# Patient Record
Sex: FEMALE | Race: BLACK OR AFRICAN AMERICAN
[De-identification: names, ages, dates, MRNs, and addresses within clinical notes are randomized per-mention and may not be internally consistent; named-entity substitution may affect disease eponyms.]

---

## 2017-02-24 ENCOUNTER — HOSPITAL ENCOUNTER (INPATIENT)
Dept: HOSPITAL 61 - ER | Age: 72
Discharge: HOME | DRG: 313 | End: 2017-02-24
Attending: INTERNAL MEDICINE | Admitting: INTERNAL MEDICINE
Payer: MEDICARE

## 2017-02-24 VITALS — WEIGHT: 185 LBS | BODY MASS INDEX: 34.04 KG/M2 | HEIGHT: 62 IN

## 2017-02-24 VITALS
SYSTOLIC BLOOD PRESSURE: 126 MMHG | DIASTOLIC BLOOD PRESSURE: 67 MMHG | SYSTOLIC BLOOD PRESSURE: 126 MMHG | DIASTOLIC BLOOD PRESSURE: 67 MMHG | SYSTOLIC BLOOD PRESSURE: 126 MMHG | DIASTOLIC BLOOD PRESSURE: 67 MMHG | SYSTOLIC BLOOD PRESSURE: 126 MMHG | DIASTOLIC BLOOD PRESSURE: 67 MMHG

## 2017-02-24 VITALS — SYSTOLIC BLOOD PRESSURE: 140 MMHG | DIASTOLIC BLOOD PRESSURE: 73 MMHG

## 2017-02-24 VITALS — DIASTOLIC BLOOD PRESSURE: 70 MMHG | SYSTOLIC BLOOD PRESSURE: 138 MMHG

## 2017-02-24 DIAGNOSIS — R07.89: Primary | ICD-10-CM

## 2017-02-24 DIAGNOSIS — I10: ICD-10-CM

## 2017-02-24 DIAGNOSIS — Z88.8: ICD-10-CM

## 2017-02-24 DIAGNOSIS — K21.9: ICD-10-CM

## 2017-02-24 DIAGNOSIS — M19.90: ICD-10-CM

## 2017-02-24 DIAGNOSIS — Z88.6: ICD-10-CM

## 2017-02-24 DIAGNOSIS — Z82.49: ICD-10-CM

## 2017-02-24 DIAGNOSIS — M85.80: ICD-10-CM

## 2017-02-24 DIAGNOSIS — Z79.82: ICD-10-CM

## 2017-02-24 DIAGNOSIS — F41.9: ICD-10-CM

## 2017-02-24 DIAGNOSIS — Z90.710: ICD-10-CM

## 2017-02-24 LAB
ANION GAP SERPL CALC-SCNC: 9 MMOL/L (ref 6–14)
BASOPHILS # BLD AUTO: 0.1 X10^3/UL (ref 0–0.2)
BASOPHILS NFR BLD: 1 % (ref 0–3)
BUN SERPL-MCNC: 14 MG/DL (ref 7–20)
CALCIUM SERPL-MCNC: 9.8 MG/DL (ref 8.5–10.1)
CHLORIDE SERPL-SCNC: 107 MMOL/L (ref 98–107)
CHOLEST SERPL-MCNC: 216 MG/DL (ref 0–200)
CHOLEST/HDLC SERPL: 3.1 {RATIO}
CO2 SERPL-SCNC: 29 MMOL/L (ref 21–32)
CREAT SERPL-MCNC: 0.9 MG/DL (ref 0.6–1)
EOSINOPHIL NFR BLD: 2 % (ref 0–3)
ERYTHROCYTE [DISTWIDTH] IN BLOOD BY AUTOMATED COUNT: 14.8 % (ref 11.5–14.5)
GFR SERPLBLD BASED ON 1.73 SQ M-ARVRAT: 74.5 ML/MIN
GLUCOSE SERPL-MCNC: 100 MG/DL (ref 70–99)
HCT VFR BLD CALC: 40.6 % (ref 36–47)
HDLC SERPL-MCNC: 69 MG/DL (ref 40–60)
HGB BLD-MCNC: 13.1 G/DL (ref 12–15.5)
LYMPHOCYTES # BLD: 2 X10^3/UL (ref 1–4.8)
LYMPHOCYTES NFR BLD AUTO: 34 % (ref 24–48)
MAGNESIUM SERPL-MCNC: 2.2 MG/DL (ref 1.8–2.4)
MCH RBC QN AUTO: 27 PG (ref 25–35)
MCHC RBC AUTO-ENTMCNC: 32 G/DL (ref 31–37)
MCV RBC AUTO: 82 FL (ref 79–100)
MONOCYTES NFR BLD: 10 % (ref 0–9)
NEUTROPHILS NFR BLD AUTO: 53 % (ref 31–73)
PLATELET # BLD AUTO: 269 X10^3/UL (ref 140–400)
POTASSIUM SERPL-SCNC: 3.7 MMOL/L (ref 3.5–5.1)
RBC # BLD AUTO: 4.95 X10^6/UL (ref 3.5–5.4)
SODIUM SERPL-SCNC: 145 MMOL/L (ref 136–145)
TRIGL SERPL-MCNC: 65 MG/DL (ref 0–150)
WBC # BLD AUTO: 5.7 X10^3/UL (ref 4–11)

## 2017-02-24 PROCEDURE — 71010: CPT

## 2017-02-24 PROCEDURE — 80061 LIPID PANEL: CPT

## 2017-02-24 PROCEDURE — 96374 THER/PROPH/DIAG INJ IV PUSH: CPT

## 2017-02-24 PROCEDURE — 84443 ASSAY THYROID STIM HORMONE: CPT

## 2017-02-24 PROCEDURE — 83735 ASSAY OF MAGNESIUM: CPT

## 2017-02-24 PROCEDURE — 80048 BASIC METABOLIC PNL TOTAL CA: CPT

## 2017-02-24 PROCEDURE — 93306 TTE W/DOPPLER COMPLETE: CPT

## 2017-02-24 PROCEDURE — 93005 ELECTROCARDIOGRAM TRACING: CPT

## 2017-02-24 PROCEDURE — 84484 ASSAY OF TROPONIN QUANT: CPT

## 2017-02-24 PROCEDURE — 85027 COMPLETE CBC AUTOMATED: CPT

## 2017-02-24 PROCEDURE — 36415 COLL VENOUS BLD VENIPUNCTURE: CPT

## 2017-02-24 NOTE — PHYS DOC
Past Medical History


Past Medical History:  GERD, Hypertension


Additional Past Medical Histor:  low potassium


Past Surgical History:  Hysterectomy, Other


Additional Past Surgical Histo:  exploratory abdominal surgery


Alcohol Use:  None


Drug Use:  None





Adult General


Chief Complaint


Chief Complaint:  CHEST PAIN





[facility]


HPI


72-year-old female woke around 0130 hrs. with significant left upper chest pain 

that does radiate into her back somewhat. She rates it a 6 out of 10. She is in 

no acute distress. She states the last time she had symptoms similar to this 

was back in 2002 in which she states she did have a heart catheterization at 

that time but did not have any cardiac intervention. She states she has history 

of hypertension but no other major medical problems that she is aware of. She 

denies any history of heart disease. She denies any shortness of breath. She 

denies any nausea or vomiting. She denies any dizziness or lightheadedness. She 

denies any diaphoresis.





Review of Systems


Review of Systems





Constitutional: Denies fever or chills []


Eyes: Denies change in visual acuity, redness, or eye pain []


HENT: Denies nasal congestion or sore throat []


Respiratory: Denies cough or shortness of breath []


Cardiovascular: No additional information not addressed in HPI []


GI: Denies abdominal pain, nausea, vomiting, bloody stools or diarrhea []


: Denies dysuria or hematuria []


Musculoskeletal: Denies back pain or joint pain []


Integument: Denies rash or skin lesions []


Neurologic: Denies headache, focal weakness or sensory changes []


Endocrine: Denies polyuria or polydipsia []





Current Medications


Current Medications





 Current Medications








 Medications


  (Trade)  Dose


 Ordered  Sig/[facility]  Start Time


 Stop Time Status Last Admin


Dose Admin


 


 Acetaminophen


  (Tylenol)  650 mg  PRN Q4HRS  PRN  2/24/17 04:00


 2/25/17 03:59   


 


 


 Aspirin


  (Guru Aspirin)  325 mg  1X  ONCE  2/24/17 04:00


 2/24/17 04:02 DC 2/24/17 04:21


325 MG


 


 Nitroglycerin


  (Nitrostat)  0.4 mg  PRN Q5MIN  PRN  2/24/17 04:00


    2/24/17 04:21


0.4 MG


 


 Ondansetron HCl


  (Zofran)  4 mg  PRN Q8HRS  PRN  2/24/17 04:00


 2/25/17 03:59  2/24/17 04:20


4 MG











Allergies


Allergies





 Allergies








Coded Allergies Type Severity Reaction Last Updated Verified


 


  ACE Inhibitors Allergy Severe SWELLING ON FACE /THROAT 7/7/15 Yes


 


  codeine Adverse Reaction Intermediate nausea/vomitting 7/7/15 Yes











Physical Exam


Physical Exam





Constitutional: Well developed, well nourished, no acute distress, non-toxic 

appearance. []


HENT: Normocephalic, atraumatic, bilateral external ears normal, oropharynx 

moist, no oral exudates, nose normal. []


Eyes: PERRLA, EOMI, conjunctiva normal, no discharge. [] 


Neck: Normal range of motion, no tenderness, supple, no stridor. [] 


Cardiovascular:Heart rate regular rhythm, no murmur []


Lungs & Thorax:  Bilateral breath sounds clear to auscultation []


Abdomen: Bowel sounds normal, soft, no tenderness, no masses, no pulsatile 

masses. [] 


Skin: Warm, dry, no erythema, no rash. [] 


Back: No tenderness, no CVA tenderness. [] 


Extremities: No tenderness, no cyanosis, no clubbing, ROM intact, no edema. [] 


Neurologic: Alert and oriented X 3, normal motor function, normal sensory 

function, no focal deficits noted. []


Psychologic: Affect normal, judgement normal, mood normal. []





Current Patient Data


Vital Signs





 Vital Signs








  Date Time  Temp Pulse Resp B/P Pulse Ox O2 Delivery O2 Flow Rate FiO2


 


2/24/17 04:21  55  137/67    


 


2/24/17 03:39 97.7  16  98 Room Air  





 97.7       








Lab Values





 Laboratory Tests








Test


  2/24/17


04:05


 


White Blood Count


  5.7x10^3/uL


(4.0-11.0)


 


Red Blood Count


  4.95x10^6/uL


(3.50-5.40)


 


Hemoglobin


  13.1g/dL


(12.0-15.5)


 


Hematocrit


  40.6%


(36.0-47.0)


 


Mean Corpuscular Volume 82fL ()  


 


Mean Corpuscular Hemoglobin 27pg (25-35)  


 


Mean Corpuscular Hemoglobin


Concent 32g/dL (31-37)


 


 


Red Cell Distribution Width


  14.8%


(11.5-14.5)  H


 


Platelet Count


  269x10^3/uL


(140-400)


 


Neutrophils (%) (Auto) 53% (31-73)  


 


Lymphocytes (%) (Auto) 34% (24-48)  


 


Monocytes (%) (Auto) 10% (0-9)  H


 


Eosinophils (%) (Auto) 2% (0-3)  


 


Basophils (%) (Auto) 1% (0-3)  


 


Neutrophils # (Auto)


  3.0x10^3uL


(1.8-7.7)


 


Lymphocytes # (Auto)


  2.0x10^3/uL


(1.0-4.8)


 


Monocytes # (Auto)


  0.6x10^3/uL


(0.0-1.1)


 


Eosinophils # (Auto)


  0.1x10^3/uL


(0.0-0.7)


 


Basophils # (Auto)


  0.1x10^3/uL


(0.0-0.2)


 


Sodium Level


  145mmol/L


(136-145)


 


Potassium Level


  3.7mmol/L


(3.5-5.1)


 


Chloride Level


  107mmol/L


()


 


Carbon Dioxide Level


  29mmol/L


(21-32)


 


Anion Gap 9 (6-14)  


 


Blood Urea Nitrogen


  14mg/dL (7-20)


 


 


Creatinine


  0.9mg/dL


(0.6-1.0)


 


Estimated GFR


(Cockcroft-Gault) 74.5  


 


 


Glucose Level


  100mg/dL


(70-99)  H


 


Calcium Level


  9.8mg/dL


(8.5-10.1)


 


Troponin I Quantitative


  < 0.017ng/mL


(0.000-0.055)





 Laboratory Tests


2/24/17 04:05








 Laboratory Tests


2/24/17 04:05











EKG


EKG


EKG as interpreted by me shows a sinus rhythm with a rate of 59 bpm. There are 

some T-wave inversions seen in V2 and V3 but no other obvious findings are seen.





Radiology/Procedures


Radiology/Procedures


One view of the chest as interpreted by me does not reveal an acute 

cardiopulmonary process.





Course & Med Decision Making


Course & Med Decision Making


Pertinent Labs and Imaging studies reviewed. (See chart for details)





This 72-year-old female with ongoing chest pain will be given a sublingual 

nitroglycerin and will be admitted to the hospital for further observation and 

treatment. I will discuss the case with Dr. Mcghee in place cardiology consult. 

Her EKG at this time does not reveal any obvious signs of ischemia. Her chest x-

ray is unremarkable. She is in no acute distress. A full laboratory workup 

including a set of cardiac enzymes will be obtained.





Her laboratory workup is unremarkable including the first set of cardiac 

enzymes. Patient feels slight improvement after nitroglycerin dose. She was 

admitted without incident.





Dragon Disclaimer


Dragon Disclaimer


This electronic medical record was generated, in whole or in part, using a 

voice recognition dictation system.





Departure


Departure


Impression:  


 Primary Impression:  


 Chest pain


Disposition:  09 ADMITTED AS INPATIENT


Admitting Physician:  Maria Teresa Mcghee


Condition:  STABLE


Referrals:  


KHADRA GARCIA MD (PCP)








LYNNE DESAI DO Feb 24, 2017 04:00

## 2017-02-24 NOTE — CARD
--------------- APPROVED REPORT --------------





EXAM: Two-dimensional and M-mode echocardiogram with Doppler and color Doppler.



Other Information 

Quality : Fair



INDICATION

Chest Pain 



2D DIMENSIONS 

RVDd2.7 (2.9-3.5cm)Left Atrium(2D)2.8 (1.6-4.0cm)

IVSd1.0 (0.7-1.1cm)Aortic Root(2D)3.1 (2.0-3.7cm)

LVDd4.9 (3.9-5.9cm)LVOT Diameter2.0 (1.8-2.4cm)

PWd1.1 (0.7-1.1cm)LVDs3.0 (2.5-4.0cm)

FS (%) 30.0 %SV78.1 ml

LVEF(%)60.0 (>50%)



Aortic Valve

AoV Peak Geovany.99.6cm/sAoV VTI19.2cm

AO Peak GR.4.0mmHgLVOT Peak Geovany.64.3cm/s

LVOT  VTI 19.70cmAO Mean GR.2mmHg

YUNI (VMAX)2.46tf9VDB   (VTI)3.38cm2



Mitral Valve

MV E Uchqrner38.2cm/sMV DECEL [id number]

MV A Ixkjhzah52.6cm/sMV [id number]

E/A  Ratio0.7MVA (PHT)2.03cm2



TDI

E/Lateral E'7.3E/Medial E'10.0



Tricuspid Valve

TR P. Kwatlqju853jc/sRAP [id number]

TR Peak Gr.35rbVxXWVL02zbWm



Pulmonary Vein

S1 Vmkskpox07.1cm/sD2 Jasusowu51.0cm/s

PVa touhcjbo092hogz



 LEFT VENTRICLE 

The left ventricle is normal size. There is normal left ventricular wall thickness. The left ventricu
lar systolic function is normal. The Ejection Fraction is 55-60%. There is normal LV segmental wall m
otion. Transmitral Doppler flow pattern is Grade I-abnormal relaxation pattern.



 RIGHT VENTRICLE 

The right ventricle is normal size. The right ventricular systolic function is normal.



 ATRIA 

The left atrium size is normal. The right atrium size is normal. The interatrial septum is intact wit
h no evidence for an atrial septal defect or patent foramen ovale as noted on 2-D or Doppler imaging.




 AORTIC VALVE 

The aortic valve is calcified but opens well. Doppler and Color Flow revealed trace to mild aortic re
gurgitation. There is no significant aortic valvular stenosis.



 MITRAL VALVE 

The mitral valve is normal in structure and function. There is no evidence of mitral valve prolapse. 
There is no mitral valve stenosis. Doppler and Color Flow revealed no mitral valve regurgitation note
d.



 TRICUSPID VALVE 

The tricuspid valve is normal in structure and function. Doppler and Color Flow revealed mild tricusp
id regurgitation. The PA pressure was estimated at 27 mmHg. There is no tricuspid valve stenosis.



 PULMONIC VALVE 

The pulmonary valve is normal in structure and function. Doppler and Color Flow revealed no pulmonic 
valvular regurgitation. There is no pulmonic valvular stenosis.



 GREAT VESSELS 

The aortic root is normal in size. The ascending aorta is normal in size. The IVC is normal in size a
nd collapses >50% with inspiration.



 PERICARDIAL EFFUSION 

There is no evidence of significant pericardial effusion.



Critical Notification

Critical Value: No



<Conclusion>

The left ventricular systolic function is normal.

The Ejection Fraction is 55-60%.

There is normal LV segmental wall motion.

Transmitral Doppler flow pattern is Grade I-abnormal relaxation pattern.

Trace to mild aortic regurgitation.

Mild tricuspid regurgitation.

The PA pressure was estimated at 27 mmHg.

There is no evidence of significant pericardial effusion.

## 2017-02-24 NOTE — PDOC2
CARDIAC CONSULT


DATE OF CONSULT


Date of Consult


DATE: 2/24/17 


TIME: 09:06





REASON FOR CONSULT


Reason for Consult:


Chest pain





REFERRING PHYSICIAN


Referring Physician:


Larissa





SOURCE


Source:  Chart review, Patient





HISTORY OF PRESENT ILLNESS


HISTORY OF PRESENT ILLNESS


This is a pleasant 71 yo female admitted for complains of chest pain.  This is 

mid chest and nonradiating and reproducible with positional changes and 

palpation, increased with left arm movements. Reports that his woke her up at 

around 0130 and has been nagging feeling since then but eventually dissipated.  

She took ASA as well.  Reports no nausea, palpitations, SOA.  She has not been 

having any decreased tolerance withe activity.  Denies any prior injury or falls

, no prior VTE, heavy lifting or significant increase in exertional activities.

  Denies any CAD. CVA, MVA. She did have LHC in 2002 and she was told of 

vasopasm but no CAD. She has HTN and she is compliant with her meds.





PAST MEDICAL HISTORY


Cardiovascular:  HTN


Pulmonary:  No pertinent hx


CENTRAL NERVOUS SYSTEM:  Other (No pertinent history)


GI:  GERD


Heme/Onc:  No pertinent hx


Psych:  No pertinent hx


Musculoskeletal:  Osteoarthritis


Rheumatologic:  No pertinent hx


Infectious disease:  No pertinent hx


ENT:  No pertinent hx


Renal/:  No pertinent hx


Endocrine:  Osteopenia


Dermatology:  No pertinent hx





PAST SURGICAL HISTORY


Past Surgical History:  Hysterectomy, Other (exploratory laparotomy ? could not 

remember the reason)





FAMILY HISTORY


Family History:  Coronary Artery Disease (Father in his 50s and mother in her 

80s)





SOCIAL HISTORY


Smoke:  No


ALCOHOL:  none


Drugs:  None


Lives:  with Family





CURRENT MEDICATIONS


CURRENT MEDICATIONS





Current Medications








 Medications


  (Trade)  Dose


 Ordered  Sig/[facility]


 Route


 PRN Reason  Start Time


 Stop Time Status Last Admin


Dose Admin


 


 Nitroglycerin


  (Nitrostat)  0.4 mg  PRN Q5MIN  PRN


 SL


 CHEST PAIN  2/24/17 04:00


    2/24/17 04:21


 


 


 Ondansetron HCl


  (Zofran)  4 mg  PRN Q8HRS  PRN


 IV


 NAUSEA/VOMITING  2/24/17 04:00


 2/25/17 03:59  2/24/17 04:20


 


 


 Aspirin


  (Guru Aspirin)  325 mg  1X  ONCE


 PO


   2/24/17 04:00


 2/24/17 04:02 DC 2/24/17 04:21


 











ALLERGIES


ALLERGIES:  


Coded Allergies:  


     ACE Inhibitors (Verified  Allergy, Severe, SWELLING ON FACE /THROAT, 7/7/15

)


     codeine (Verified  Adverse Reaction, Intermediate, nausea/vomitting, 7/7/15

)





ROS


Review of System


14 point ROS evaluated with pertinent positives noted per HPI





PHYSICAL EXAM


General:  Alert, Oriented X3, Cooperative, No acute distress


HEENT:  Atraumatic, Mucous membr. moist/pink


Lungs:  Clear to auscultation, Normal air movement


Heart:  Regular rate, Normal S1, Normal S2, No murmurs


Abdomen:  Soft, No tenderness


Extremities:  No cyanosis, No edema


Neuro:  Normal speech, Sensation intact


Psych/Mental Status:  Mental status NL, Mood NL


MUSCULOSKELETAL:  Full range of motion without pain, Osteoarthritic changes 

both hands





VITALS


VITALS





 Vital Signs








  Date Time  Temp Pulse Resp B/P Pulse Ox O2 Delivery O2 Flow Rate FiO2


 


2/24/17 05:43  57  152/73 96 Room Air  


 


2/24/17 03:39 97.7  16     





 97.7       











LABS


Lab:





Laboratory Tests








Test


  2/24/17


04:05


 


White Blood Count


  5.7x10^3/uL


(4.0-11.0)


 


Red Blood Count


  4.95x10^6/uL


(3.50-5.40)


 


Hemoglobin


  13.1g/dL


(12.0-15.5)


 


Hematocrit


  40.6%


(36.0-47.0)


 


Mean Corpuscular Volume 82fL () 


 


Mean Corpuscular Hemoglobin 27pg (25-35) 


 


Mean Corpuscular Hemoglobin


Concent 32g/dL (31-37) 


 


 


Red Cell Distribution Width


  14.8%


(11.5-14.5)


 


Platelet Count


  269x10^3/uL


(140-400)


 


Neutrophils (%) (Auto) 53% (31-73) 


 


Lymphocytes (%) (Auto) 34% (24-48) 


 


Monocytes (%) (Auto) 10% (0-9) 


 


Eosinophils (%) (Auto) 2% (0-3) 


 


Basophils (%) (Auto) 1% (0-3) 


 


Neutrophils # (Auto)


  3.0x10^3uL


(1.8-7.7)


 


Lymphocytes # (Auto)


  2.0x10^3/uL


(1.0-4.8)


 


Monocytes # (Auto)


  0.6x10^3/uL


(0.0-1.1)


 


Eosinophils # (Auto)


  0.1x10^3/uL


(0.0-0.7)


 


Basophils # (Auto)


  0.1x10^3/uL


(0.0-0.2)


 


Sodium Level


  145mmol/L


(136-145)


 


Potassium Level


  3.7mmol/L


(3.5-5.1)


 


Chloride Level


  107mmol/L


()


 


Carbon Dioxide Level


  29mmol/L


(21-32)


 


Anion Gap 9 (6-14) 


 


Blood Urea Nitrogen 14mg/dL (7-20) 


 


Creatinine


  0.9mg/dL


(0.6-1.0)


 


Estimated GFR


(Cockcroft-Gault) 74.5 


 


 


Glucose Level


  100mg/dL


(70-99)


 


Calcium Level


  9.8mg/dL


(8.5-10.1)


 


Troponin I Quantitative


  < 0.017ng/mL


(0.000-0.055)











ASSESSMENT/PLAN


ASSESSMENT/PLAN


1. Atypical CP: suspect MSK. 


2. Accelerated HTN: much better now.  elevation likely from anxiety





Recommendations


1. Repeat troponin. Obtain TSH, lipid panel


2. EKG SB with no significant changes by comparison. TTE today. If unremarkable 

then no further cardiac testing. 


3. Continue with home amlodipine and start daily ECASA 81 mg.


Problems:  








JOSIANE MONTANEZ Feb 24, 2017 09:18

## 2017-02-24 NOTE — DS
DATE OF DISCHARGE:  02/24/2017



CHIEF COMPLAINT:  Chest pain.



HISTORY OF PRESENT ILLNESS:  This is a 72-year-old  female with prior

history of hypertension and coronary artery disease in family presents to the ER

with complaints of left-sided chest pain localized, presents, from yesterday

early morning she woke up in the middle of the night with localized pain.  There

is not any radiation or nausea or vomiting or palpitations or diaphoresis.  She

had a stress test in the past.  As per the patient, it was normal.  She was

admitted to the hospital to rule out ACS.  At the time of my examination this

morning, the patient denies any active chest pain.  She is resting comfortably.



PAST MEDICAL HISTORY AND REVIEW OF SYSTEMS:  Please see electronic H and P.



LABORATORY FINDINGS:  Troponin 3 sets negative.  BMP within normal range.  CBC

within normal range.  Lipid panel, cholesterol 216, LDL is 134, VLDL is 13, HDL

is 69.  TSH is 3.190.



ASSESSMENT AND PLAN:

1.  Chest pain in an adult with prior history of coronary artery disease in the

family.

2.  Hypertension.



PLAN:  She has been admitted to rule out ACS, and 3 sets of electrocardiograms

and echocardiograms have been ordered and cardiac consult.  The patient did not

have any other risk factors for PE or aortic dissection.



BRIEF HOSPITAL COURSE:  She has been admitted and monitored in telemetry, and 3

sets of troponins withdrawn, which were negative, and clinically the patient is

chest pain free, and she has chest tenderness on examination and had evaluation

by Cardiology, declared that the patient has noncardiac chest pain, most likely

musculoskeletal.



DISCHARGE DISPOSITION:  Home.



DISCHARGE CONDITION:  Stable.



DISCHARGE MEDICATIONS:  Continue current medications.  She has been recommended

to do aggressive diet management for lipid control.



DIET:  Low fat diet and cardiac diet.



DISPOSITION:  Home.



Total time spent for exam and discharge is 45 minutes.

 



______________________________

EZIO DORAN MD



DR:  BRENDAN/jazmin  JOB#:  [id number] / [id number]

DD:  02/24/2017 19:11  DT:  02/24/2017 22:52

## 2017-02-24 NOTE — ACF
Admit Criteria Forms


                            CARDIOLOGY GRG





Clinical Indications for Admission to Inpatient Care


    


                                                                               (

Place 'X' for any and all applicable criteria): 





Hospital admission is needed for appropriate care of the patient because of ANY 

ONE of the following (1): 





[ ] I.    Hemodynamic instability as indicated by ALL of the following (1)(2)(3)

(4)(5)


         [ ]a)   Vital signs or other findings not as expected for chronic 

patient condition or baseline


         [ ]b)   Instability indicated by ANY ONE of the following:


                  [ ]i)     Hypotension


                  [ ]ii)    Symptomatic Tachycardia unresponsive to treatment (

e.g., analgesia, fluids, sedation as indicated)


                  [ ]iii)   Inadequate perfusion indicated by ANY ONE of the 

following:


                            [ ] 1)   Lactic acidosis (> 2 mmol/L)


                            [ ] 2)   New abnormal capillary refill (> 3 seconds)


                            [ ] 3)   Reduced urine output


                            [ ] 4)   New altered mental status


                  [ ]iv)   Orthostatic vital sign changes unresponsive to 

treatment (e.g., fluids)              


                  [ ]v)    IV inotropic or vasopressor medication required to 

maintain adequate blood pressure or perfusion


[ ] II.  Severe heart failure as indicated by ANY ONE of the following(17)(18)


         [ ]a)  Respiratory distress        


         [ ]b)  Hypotension


         [ ]c)  Anasarca (refractory to outpatient therapy) 


         [ ]d)  Cardiac arrhythmias of immediate concern 


         [ ]e)  Myocardial ischemia


[ ] III. Cardiac arrhythmias or findings of immediate concern indicated by ANY 

ONE of the following (19)(20):


         [ ] a)  Heart rhythms that are inherently dangerous or unstable 

indicated by ANY ONE of the following (21)(22)(23):


                 [ ] i)    Resuscitated ventricular fibrillation or cardiac 

arrest


                 [ ] ii)   Ventricular escape rhythm


                 [ ] iii)  Sustained ventricular tachycardia (30 seconds or 

more of ventricular rhythm at greater than 100 beats per minute)


                 [ ] iv)  Nonsustained ventricular tachycardia and ANY ONE of 

the following:


                          [ ] 1)    Suspected cardiac ischemia as cause or 

consequence of ventricular tachycardia    


                          [ ] 2)    In setting of acute myocarditis         


         [ ] b)  Unstable cardiac conduction defects indicated by ANY ONE of 

the following(23)(24)(25)


                  [ ] i)    Type II second-degree atrioventricular block    


                  [ ]ii)    Third-degree atrioventricular block                


                  [ ]iii)    New-onset left bundle branch block with suspected 

myocardial ischemia


         [ ]c)   Any heart rhythm and ANY ONE of the following (21)(22)(26)(27) 

(28)


                  [ ] i)    Continuous long-term ECG monitoring needed (e.g., 

initiation of drug requiring monitoring for more than 24 hours)


                  [ ] ii)   Patient has automatic implanted cardioverter 

defibrillator that is repeatedly firing, malfunctioning, or in need of 

immediate 


                            adjustment of settings beyond the scope of 

ambulatory or observation care


        [ ]d)    Heart rhythms of concern due to ANY ONE of the following:


                  [ ] i)    Hypotension


                  [ ] ii)    Respiratory distress


                  [ ] iii)   Association with other significant symptoms (e.g., 

bradycardia with syncope or ongoing dizziness, supraventricular 


                            tachycardia with chest pain (14)(15)(17)


[ ] IV.   Monitoring for cardiac contusion beyond the scope of observation care 

needed [A](30)(31)(32)


[ ] V.    Surgical or device complication (e.g., valve replacement complication

, pacemaker dysfunction) (35)(41)(44)(45)(46)


[ ] VI.   Inpatient palliative care needed. [B](49) Also use Inpatient 

Palliative Care Criteria


[ ] VII.  Nonbacterial thrombotic (marantic) endocarditis (36)(43)(47)(48)


[X] VIII. Cardiology condition, symptom, or finding for which emergency and 

observation care has failed or are not considered appropriate.


[ ] IX.   Acute valvular disease requiring inpatient as indicated by ANY ONE of 

the following (41)


          [ ]a)   Acute valvular regurgitation (42)


          [ ]b)   Noninfectious valvulitis (43)


          [ ]c)   Obstructive valve thrombosis 


          [ ]d)   Paravalvular leak


          [ ]e)   Other significant valvular disorder remaining after emergency 

or observation level of care (as appropriate)


[ ]X.    Pericardial disease requiring inpatient treatment as indicated by ANY 

ONE of the following (33)(34)(35)(36)(37)           


          [ ]a)   Suspected tamponade (38)(39)(40)


          [ ]b)   Hemopericardium


          [ ]c)   Other significant pericardial disorder remaining after 

emergency or observation level of care (as appropriate)


[ ] XI.  Cardiac ischemia beyond scope of emergency and observation care. 


[ ] XII. Hypertension requiring inpatient treatment as indicated by ANY ONE of 

the following (6)(7)(8)


         [ ]a)    SBP greater than 220 mm Hg or DBP greater than 120 mmHg 

despite treatment


         [ ]b)    SBP greater than 140 mm Hg or DBP greater than 100 mm Hg with 

evidence of acute end organ damage as indicated


                   by ANY ONE of the following


                   [ ] i)      Encephalopathy


                   [ ] ii)     Acute renal failure as indicated by new onset of 

ANY ONE of the following (9)(10)(11)(12)(13)


                               [ ]1)  3-fold rise in serum creatinine from 

baseline


                               [ ]2)  Serum creatinine greater than 4 mg/dL (

354 micromoles/L) with acute rise greater than 0.5 mg/dL (44.2 micromoles/L)


                               [ ]3)  Reduction of more than 75% in estimated 

glomerular filtration rate from baseline 


                               [ ]4)  Estimated glomerular filtration rate less 

than 35 mL/min/1.73m2 (0.59 mL/sec/1.73m2) in child up to 18 years of age


                               [ ]5)  Cessation of urine output indicated by ALL

 of the following


                                       [ ]A.   Adequate volume status


                                       [ ]B.   Inadequate urine output as 

indicated by ANY ONE of the following       


                                                [ ]a.   Urine output less than 

0.3 mL/kg/hr for 24 hours


                                                [ ]b.   Anuria (urine output 

less than 0.1 mL/kg/hr) for 12 hours 


                  [ ] iii)      Aortic dissection


                  [ ] iv)      Myocardial Ischemia


                  [ ] v)       Left ventricular heart failure 


                  [ ]vi)       Retinal Hemorrhage


                  [ ]vii)      Other significant finding


         [ ]c)   Hypertension in child requiring inpatient treatment as 

indicated by ALL of the following(14)(15)(16)


                  [ ] i)        Outpatient treatment not effective, not 

available, or not appropriate               


                  [ ]ii)        SBP or DBP greater than 95th percentile for age


                  [ ]iii)       Evidence of acute end organ damage as indicated 

by ANY ONE of the following 


                               [ ]1)     Altered mental status


                               [ ]2)    Acute renal failure as indicated by new 

onset of ANY ONE of the following(9)(10)(11)(12)(13)


                                         [ ]A.    3-fold rise in serum 

creatinine from baseline


                                         [ ]B.    Serum creatinine greater than 

4 mg/dL (354 micromoles/L) with acute rise greater than 0.5 mg/dL (44.2 

micromoles/L)


                                         [ ]C.    Reduction of more than 75% in 

estimated glomerular filtration rate from baseline


                                         [ ]D.    Estimated glomerular 

filtration rate less than 35 mL/min/1.73m2 (0.59 mL/sec/1.73m2) in child up to 

18 years of age 


                                         [ ]E.    Cessation of urine output 

indicated by ALL of the following 


                                                   [ ]a.  Adequate volume status


                                                   [ ]b.  Inadequate urine 

output as indicated by ANY ONE of the following 


                                                           [ ]i)    Urine 

output less than 0.3 mL/kg/hr for 24 hours


                                                           [ ]ii)   Anuria (

urine output less than 0.1 mL/kg/hr) for 12 hours                              

  


                                                    [ ]3)  Severe headache


                              [ ]4)  Visual disturbance 


                              [ ]5)  Retinal hemorrhage


                              [ ]6)  Other significant finding


[ ]XIII.   Complications of transplanted heart indicated by ANY ONE of the 

following(61):


           [ ]a)  Acute graft rejection requiring inpatient management (eg, 

intravenous immunosuppression)(62)(63)


           [ ]b)  Acute graft heart failure indicated by ANY ONE of the 

following(64):


                   [ ]i)   Hemodynamic instability


                   [ ]ii)  Cardiac arrhythmias of immediate concern


                   [ ]iii) Pulmonary edema that is very severe (eg, mechanical 

ventilation needed,


                          imminent or likely, need for 100% oxygen to keep 

oxygen saturation above 90%)


                   [ ]iv) Pulmonary edema that is persistent as indicated by ALL

 of the following:


                          [ ]1)   New need for oxygen therapy to keep oxygen 

saturation above 90% (or increased FiO2 need from baseline)


                          [ ]2)   Has not improved sufficiently with emergency 

department or observation


                                   care IV diuretics or other heart failure 

treatments[E]


                   [ ]v)   Altered mental status that is severe or persistent


                   [ ]vi)   Increased creatinine (new on laboratory test) with 

reduction of more than 50% in


                            estimated glomerular filtration rate from baseline


                   [ ]vii)  Progressively (ongoing) rising creatinine (known 

from past laboratory test) with


                            reduction of more than 25% in estimated glomerular 

filtration rate from baseline


                   [ ]viii) Acute renal failure


                   [ ]ix)  Acute peripheral ischemia (eg, examination shows 

pulseless, cool, mottled, or cyanotic extremity)


                   [ ]x)   Pulmonary artery catheter monitoring needed


                   [ ]xi)  Other sign or symptom of heart failure requiring 

inpatient treatment (ie, too severe or


                            not responsive to outpatient and observation care 

treatment)


        [ ]c)    Infection requiring inpatient management (eg, Hemodynamic 

instability, need for intravenous antimicrobial treatment)(66)(67)(68)(69)(70)


        [ ]d)   Cardiac allograft vasculopathy requiring inpatient management (

eg evidence of cardiac ischemia)(71)


        [ ]e)   Other complication of transplanted heart (eg, stroke, severe 

pulmonary hypertension, severe valvular 


                 dysfunction) requiring inpatient management(72)








The original [facility] content created by [facility] has been revised. 


The portions of the content which have been revised are identified through the 

use of italic text or in bold, and [facility] 


has neither reviewed nor approved the modified material. All other unmodified 

content is copyright  [facility].





Please see references footnoted in the original [facility] edition 

2016








DILMA SOLIMAN Feb 24, 2017 04:55

## 2017-02-24 NOTE — EKG
[facility]

               [address] [place] [address]

Test Date:    2017               Test Time:    03:45:23

Pat Name:     ANDREE ESTES             Department:   

Patient ID:   [id number]           Room:         [facility]

Gender:       F                        Technician:   

:          1945               Requested By: LYNNE DESAI

Order Number: [id number]            Reading MD:   Cecilia Levin

                                 Measurements

Intervals                              Axis          

Rate:         59                       P:            26

MT:           162                      QRS:          29

QRSD:         82                       T:            41

QT:           448                                    

QTc:          444                                    

                           Interpretive Statements

SINUS RHYTHM

T ABNORMALITY IN ANTERIOR LEADS



Electronically Signed On 2017 20:13:50 CST by Cecilia Levin

## 2017-02-24 NOTE — RAD
EXAM: Chest one view.



HISTORY: Shortness of breath.



COMPARISON: 8/13/2013.



FINDINGS: A frontal view of the chest is obtained.    



There are no confluent infiltrates. There is no pneumothorax or pleural

effusion. The heart is not enlarged.    



IMPRESSION: 

1. No confluent infiltrates.

## 2017-02-24 NOTE — PDOC1
History and Physical


Past Medical History


Cardiovascular:  HTN


Pulmonary:  No pertinent hx


CENTRAL NERVOUS SYSTEM:  Other (No pertinent history)


GI:  GERD


Heme/Onc:  No pertinent hx


Psych:  No pertinent hx


Rheumatologic:  No pertinent hx


Infectious disease:  No pertinent hx


ENT:  No pertinent hx


Renal/:  No pertinent hx


Endocrine:  Osteopenia


Dermatology:  No pertinent hx





Past Surgical History


Past Surgical History:  Hysterectomy, Other (exploratory laparotomy ? could not 

remember the reason)





Family History


Family History:  Coronary Artery Disease (Father in his 50s and mother in her 

80s)





Social History


Smoke:  No


ALCOHOL:  none


Drugs:  None





Current Problem List


Problem List


 Problems


Medical Problems:


(1) Chest pain


Status: Acute  











Current Medications


Current Medications





 Current Medications








 Medications


  (Trade)  Dose


 Ordered  Sig/Ness  Start Time


 Stop Time Status Last Admin


Dose Admin


 


 Acetaminophen


  (Tylenol)  650 mg  PRN Q4HRS  PRN  2/24/17 04:00


 2/25/17 03:59   


 


 


 Amlodipine


 Besylate


  (Norvasc)  10 mg  DAILY  2/25/17 09:00


   UNV  


 


 


 Aspirin


  (Guru Aspirin)  325 mg  1X  ONCE  2/24/17 04:00


 2/24/17 04:02 DC 2/24/17 04:21


325 MG


 


 Nitroglycerin


  (Nitrostat)  0.4 mg  PRN Q5MIN  PRN  2/24/17 04:00


    2/24/17 04:21


0.4 MG


 


 Non-Formulary


 Medication  1 tab  WEEKLY  3/3/17 09:00


   UNV  


 


 


 Ondansetron HCl


  (Zofran)  4 mg  PRN Q8HRS  PRN  2/24/17 04:00


 2/25/17 03:59  2/24/17 04:20


4 MG


 


 Potassium Chloride


  (Klor-Con)  20 meq  BID  2/24/17 21:00


   UNV  


 











Allergies


Allergies





 Allergies








Coded Allergies Type Severity Reaction Last Updated Verified


 


  ACE Inhibitors Allergy Severe SWELLING ON FACE /THROAT 7/7/15 Yes


 


  codeine Adverse Reaction Intermediate nausea/vomitting 7/7/15 Yes











ROS


Review of System


CONSTITUTIONAL:        No fever or chills


EYES:                          No recent changes


SKIN:               No rash or itching


CARDIOVASCULAR:     Chest pain, NO syncope, palpitations, or edema


RESPIRATORY:            No SOB or cough


GASTROINTESTINAL:    No nausea, vomiting or abdominal pain


NEUROLOGICAL:          No headaches or weakness


ENDOCRINE:               No cold or heat intolerance


GENITOURINARY:         No urgency or frequency of urination


MUSCULOSKELETAL:   No back pain or joint pain


LYMPHATICS:               No enlarged lymph nodes


PSYCHIATRIC:              No anxiety or depression





Physical Exam


Physical Exam


GEN.:    No apparent distress.  Alert and oriented.


HEENT:    Head is normocephalic, atraumatic


NECK:    Supple.  


LUNGS:    Clear to auscultation.


HEART:    RRR, S1, S2 present.  Peripheral pulses intact. Left Chest 

tenderness. 


ABDOMEN:    Soft, nontender.  Positive bowel sounds.


EXTREMITIES:    Without any cyanosis.    


NEUROLOGIC:     Normal speech, normal tone


PSYCHIATRIC:    Normal affect, normal mood.


SKIN:   No ulcerations





Vitals


Vitals





 Vital Signs








  Date Time  Temp Pulse Resp B/P Pulse Ox O2 Delivery O2 Flow Rate FiO2


 


2/24/17 09:26      Room Air  


 


2/24/17 07:30 97.5 57 18 138/70 98   





 97.5       











Labs


Labs





Laboratory Tests








Test


  2/24/17


04:05 2/24/17


09:50


 


White Blood Count


  5.7x10^3/uL


(4.0-11.0) 


 


 


Red Blood Count


  4.95x10^6/uL


(3.50-5.40) 


 


 


Hemoglobin


  13.1g/dL


(12.0-15.5) 


 


 


Hematocrit


  40.6%


(36.0-47.0) 


 


 


Mean Corpuscular Volume 82fL ()  


 


Mean Corpuscular Hemoglobin 27pg (25-35)  


 


Mean Corpuscular Hemoglobin


Concent 32g/dL (31-37) 


  


 


 


Red Cell Distribution Width


  14.8%


(11.5-14.5) 


 


 


Platelet Count


  269x10^3/uL


(140-400) 


 


 


Neutrophils (%) (Auto) 53% (31-73)  


 


Lymphocytes (%) (Auto) 34% (24-48)  


 


Monocytes (%) (Auto) 10% (0-9)  


 


Eosinophils (%) (Auto) 2% (0-3)  


 


Basophils (%) (Auto) 1% (0-3)  


 


Neutrophils # (Auto)


  3.0x10^3uL


(1.8-7.7) 


 


 


Lymphocytes # (Auto)


  2.0x10^3/uL


(1.0-4.8) 


 


 


Monocytes # (Auto)


  0.6x10^3/uL


(0.0-1.1) 


 


 


Eosinophils # (Auto)


  0.1x10^3/uL


(0.0-0.7) 


 


 


Basophils # (Auto)


  0.1x10^3/uL


(0.0-0.2) 


 


 


Sodium Level


  145mmol/L


(136-145) 


 


 


Potassium Level


  3.7mmol/L


(3.5-5.1) 


 


 


Chloride Level


  107mmol/L


() 


 


 


Carbon Dioxide Level


  29mmol/L


(21-32) 


 


 


Anion Gap 9 (6-14)  


 


Blood Urea Nitrogen 14mg/dL (7-20)  


 


Creatinine


  0.9mg/dL


(0.6-1.0) 


 


 


Estimated GFR


(Cockcroft-Gault) 74.5 


  


 


 


Glucose Level


  100mg/dL


(70-99) 


 


 


Calcium Level


  9.8mg/dL


(8.5-10.1) 


 


 


Magnesium Level


  2.2mg/dL


(1.8-2.4) 


 


 


Troponin I Quantitative


  < 0.017ng/mL


(0.000-0.055) < 0.017ng/mL


(0.000-0.055)


 


Triglycerides Level


  65mg/dL


(0-150) 


 


 


Cholesterol Level


  216mg/dL


(0-200) 


 


 


LDL Cholesterol, Calculated


  134mg/dL


(0-100) 


 


 


VLDL Cholesterol, Calculated 13mg/dL (0-40)  


 


HDL Cholesterol


  69mg/dL


(40-60) 


 


 


Cholesterol/HDL Ratio 3.1  


 


Thyroid Stimulating Hormone


(TSH) 3.190uIU/mL


(0.358-3.74) 


 








Laboratory Tests








Test


  2/24/17


04:05 2/24/17


09:50


 


White Blood Count


  5.7x10^3/uL


(4.0-11.0) 


 


 


Red Blood Count


  4.95x10^6/uL


(3.50-5.40) 


 


 


Hemoglobin


  13.1g/dL


(12.0-15.5) 


 


 


Hematocrit


  40.6%


(36.0-47.0) 


 


 


Mean Corpuscular Volume 82fL ()  


 


Mean Corpuscular Hemoglobin 27pg (25-35)  


 


Mean Corpuscular Hemoglobin


Concent 32g/dL (31-37) 


  


 


 


Red Cell Distribution Width


  14.8%


(11.5-14.5) 


 


 


Platelet Count


  269x10^3/uL


(140-400) 


 


 


Neutrophils (%) (Auto) 53% (31-73)  


 


Lymphocytes (%) (Auto) 34% (24-48)  


 


Monocytes (%) (Auto) 10% (0-9)  


 


Eosinophils (%) (Auto) 2% (0-3)  


 


Basophils (%) (Auto) 1% (0-3)  


 


Neutrophils # (Auto)


  3.0x10^3uL


(1.8-7.7) 


 


 


Lymphocytes # (Auto)


  2.0x10^3/uL


(1.0-4.8) 


 


 


Monocytes # (Auto)


  0.6x10^3/uL


(0.0-1.1) 


 


 


Eosinophils # (Auto)


  0.1x10^3/uL


(0.0-0.7) 


 


 


Basophils # (Auto)


  0.1x10^3/uL


(0.0-0.2) 


 


 


Sodium Level


  145mmol/L


(136-145) 


 


 


Potassium Level


  3.7mmol/L


(3.5-5.1) 


 


 


Chloride Level


  107mmol/L


() 


 


 


Carbon Dioxide Level


  29mmol/L


(21-32) 


 


 


Anion Gap 9 (6-14)  


 


Blood Urea Nitrogen 14mg/dL (7-20)  


 


Creatinine


  0.9mg/dL


(0.6-1.0) 


 


 


Estimated GFR


(Cockcroft-Gault) 74.5 


  


 


 


Glucose Level


  100mg/dL


(70-99) 


 


 


Calcium Level


  9.8mg/dL


(8.5-10.1) 


 


 


Magnesium Level


  2.2mg/dL


(1.8-2.4) 


 


 


Troponin I Quantitative


  < 0.017ng/mL


(0.000-0.055) < 0.017ng/mL


(0.000-0.055)


 


Triglycerides Level


  65mg/dL


(0-150) 


 


 


Cholesterol Level


  216mg/dL


(0-200) 


 


 


LDL Cholesterol, Calculated


  134mg/dL


(0-100) 


 


 


VLDL Cholesterol, Calculated 13mg/dL (0-40)  


 


HDL Cholesterol


  69mg/dL


(40-60) 


 


 


Cholesterol/HDL Ratio 3.1  


 


Thyroid Stimulating Hormone


(TSH) 3.190uIU/mL


(0.358-3.74) 


 











VTE Prophylaxis Ordered


VTE Prophylaxis Devices:  No


VTE Pharmacological Prophylaxi:  No








EZIO DORAN MD Feb 24, 2017 12:28

## 2017-11-13 ENCOUNTER — HOSPITAL ENCOUNTER (OUTPATIENT)
Dept: HOSPITAL 61 - MAMMO | Age: 72
Discharge: HOME | End: 2017-11-13
Attending: FAMILY MEDICINE
Payer: MEDICARE

## 2017-11-13 DIAGNOSIS — Z12.31: Primary | ICD-10-CM

## 2017-11-13 NOTE — RAD
DATE: 11/13/2017



EXAM: DIGITAL SCREEN BILAT W/CAD



HISTORY: Routine screening



COMPARISON: 11/11/2016



This study was interpreted with the benefit of Computerized Aided Detection

(CAD).





The breast parenchyma is primarily fatty replaced. Breast parenchyma level

density A.





FINDINGS: No new or enlarging breast densities are seen. No suspicious

microcalcifications are evident.  





IMPRESSION: Stable mammograms without evidence of malignancy.





BI-RADS CATEGORY: 1 NEGATIVE



RECOMMENDED FOLLOW-UP: 12M 12 MONTH FOLLOW-UP



PQRS compliance statement: Patient information was entered into a reminder

system with a target due date     for the next mammogram.



Mammography is a sensitive method for finding small breast cancers, but it

does not detect them all and is not a substitute for careful clinical

examination.  A negative mammogram does not negate a clinically suspicious

finding and should not result in delay in biopsying a clinically suspicious

abnormality.



"Our facility is accredited by the [employer] Mammography

Program."

## 2018-11-19 ENCOUNTER — HOSPITAL ENCOUNTER (OUTPATIENT)
Dept: HOSPITAL 61 - MAMMO | Age: 73
Discharge: HOME | End: 2018-11-19
Attending: FAMILY MEDICINE
Payer: MEDICARE

## 2018-11-19 DIAGNOSIS — Z12.31: Primary | ICD-10-CM

## 2018-11-19 PROCEDURE — 77063 BREAST TOMOSYNTHESIS BI: CPT

## 2018-11-19 PROCEDURE — 77067 SCR MAMMO BI INCL CAD: CPT

## 2018-11-20 NOTE — RAD
DATE: 11/19/2018



EXAM: MAMMO COOKIE SCREENING BILATERAL



HISTORY: Routine screening.  Maternal cousin diagnosed with breast cancer.



COMPARISON: 11/10/2015, 11/11/2016, 11/13/2017 mammographic exams



This study was interpreted with the benefit of Computerized Aided Detection

(CAD).





Breast Density: SCATTERED The breast parenchyma shows scattered fibroglandular

densities. Breast parenchyma level B.





FINDINGS: Benign calcifications are minimal.  No dominant masses or

distortion.  No suspicious calcification clusters in the interval. 

Benign-appearing left axillary lymph nodes are present.





IMPRESSION: No suspicious findings.







BI-RADS CATEGORY: 2 BENIGN FINDING(S)



RECOMMENDED FOLLOW-UP: 12M 12 MONTH FOLLOW-UP



PQRS compliance statement: Patient information was entered into a reminder

system with a target due date in one year for the next mammogram.



Mammography is a sensitive method for finding small breast cancers, but it

does not detect them all and is not a substitute for careful clinical

examination.  A negative mammogram does not negate a clinically suspicious

finding and should not result in delay in biopsying a clinically suspicious

abnormality.



"Our facility is accredited by the American [employer] Mammography

Program."

## 2019-11-20 ENCOUNTER — HOSPITAL ENCOUNTER (OUTPATIENT)
Dept: HOSPITAL 61 - MAMMO | Age: 74
Discharge: HOME | End: 2019-11-20
Attending: FAMILY MEDICINE
Payer: MEDICARE

## 2019-11-20 DIAGNOSIS — Z12.31: Primary | ICD-10-CM

## 2019-11-20 PROCEDURE — 77067 SCR MAMMO BI INCL CAD: CPT

## 2019-11-20 PROCEDURE — 77063 BREAST TOMOSYNTHESIS BI: CPT

## 2019-11-20 NOTE — RAD
DATE: 11/20/2019.



EXAM: MAMMO COOKIE SCREENING BILATERAL.



HISTORY: Routine mammographic screening.



COMPARISON: 11/19/2018.



This study was interpreted with the benefit of Computerized Aided Detection

(CAD).



FINDINGS:



Breast Density:  FATTY The breast parenchyma is primarily fatty replaced.

Breast parenchyma level density A..



There are no suspicious masses, microcalcifications or architectural

distortion.  The parenchymal pattern is stable.



BI-RADS CATEGORY: 1 NEGATIVE.



RECOMMENDED FOLLOW-UP: 12M 12 MONTH FOLLOW-UP.



PQRS compliance statement: Patient information was entered into a reminder

system with a target due date 11/20/2020 for the next mammogram.



Mammography is a sensitive method for finding small breast cancers, but it

does not detect them all and is not a substitute for careful clinical

examination.  A negative mammogram does not negate a clinically suspicious

finding and should not result in delay in biopsying a clinically suspicious

abnormality.



"Our facility is accredited by the [employer] Mammography

Program."

## 2020-04-04 ENCOUNTER — HOSPITAL ENCOUNTER (INPATIENT)
Dept: HOSPITAL 61 - ER | Age: 75
LOS: 4 days | Discharge: HOME | DRG: 177 | End: 2020-04-08
Attending: INTERNAL MEDICINE | Admitting: INTERNAL MEDICINE
Payer: MEDICARE

## 2020-04-04 VITALS — HEIGHT: 62 IN | BODY MASS INDEX: 53.23 KG/M2 | WEIGHT: 289.25 LBS

## 2020-04-04 VITALS — SYSTOLIC BLOOD PRESSURE: 126 MMHG | DIASTOLIC BLOOD PRESSURE: 64 MMHG

## 2020-04-04 VITALS — DIASTOLIC BLOOD PRESSURE: 68 MMHG | SYSTOLIC BLOOD PRESSURE: 132 MMHG

## 2020-04-04 VITALS — DIASTOLIC BLOOD PRESSURE: 49 MMHG | SYSTOLIC BLOOD PRESSURE: 103 MMHG

## 2020-04-04 DIAGNOSIS — Z82.49: ICD-10-CM

## 2020-04-04 DIAGNOSIS — Z79.899: ICD-10-CM

## 2020-04-04 DIAGNOSIS — M85.80: ICD-10-CM

## 2020-04-04 DIAGNOSIS — K21.9: ICD-10-CM

## 2020-04-04 DIAGNOSIS — G40.909: ICD-10-CM

## 2020-04-04 DIAGNOSIS — J12.89: ICD-10-CM

## 2020-04-04 DIAGNOSIS — E78.5: ICD-10-CM

## 2020-04-04 DIAGNOSIS — Z88.8: ICD-10-CM

## 2020-04-04 DIAGNOSIS — E87.1: ICD-10-CM

## 2020-04-04 DIAGNOSIS — Z90.710: ICD-10-CM

## 2020-04-04 DIAGNOSIS — Z78.9: ICD-10-CM

## 2020-04-04 DIAGNOSIS — F79: ICD-10-CM

## 2020-04-04 DIAGNOSIS — I10: ICD-10-CM

## 2020-04-04 DIAGNOSIS — E86.0: ICD-10-CM

## 2020-04-04 DIAGNOSIS — J98.11: ICD-10-CM

## 2020-04-04 DIAGNOSIS — U07.1: Primary | ICD-10-CM

## 2020-04-04 DIAGNOSIS — N17.9: ICD-10-CM

## 2020-04-04 LAB
ALBUMIN SERPL-MCNC: 3.1 G/DL (ref 3.4–5)
ALBUMIN/GLOB SERPL: 0.8 {RATIO} (ref 1–1.7)
ALP SERPL-CCNC: 52 U/L (ref 46–116)
ALT SERPL-CCNC: 18 U/L (ref 14–59)
AMORPH SED URNS QL MICRO: PRESENT /HPF
ANION GAP SERPL CALC-SCNC: 6 MMOL/L (ref 6–14)
APTT PPP: YELLOW S
AST SERPL-CCNC: 34 U/L (ref 15–37)
BACTERIA #/AREA URNS HPF: 0 /HPF
BASOPHILS # BLD AUTO: 0 X10^3/UL (ref 0–0.2)
BASOPHILS NFR BLD: 1 % (ref 0–3)
BILIRUB SERPL-MCNC: 0.5 MG/DL (ref 0.2–1)
BILIRUB UR QL STRIP: NEGATIVE
BUN SERPL-MCNC: 21 MG/DL (ref 7–20)
BUN/CREAT SERPL: 14 (ref 6–20)
CALCIUM SERPL-MCNC: 8 MG/DL (ref 8.5–10.1)
CHLORIDE SERPL-SCNC: 100 MMOL/L (ref 98–107)
CO2 SERPL-SCNC: 26 MMOL/L (ref 21–32)
CREAT SERPL-MCNC: 1.5 MG/DL (ref 0.6–1)
EOSINOPHIL NFR BLD: 0 % (ref 0–3)
EOSINOPHIL NFR BLD: 0 X10^3/UL (ref 0–0.7)
ERYTHROCYTE [DISTWIDTH] IN BLOOD BY AUTOMATED COUNT: 14.8 % (ref 11.5–14.5)
FIBRINOGEN PPP-MCNC: CLEAR MG/DL
GFR SERPLBLD BASED ON 1.73 SQ M-ARVRAT: 41 ML/MIN
GLOBULIN SER-MCNC: 3.8 G/DL (ref 2.2–3.8)
GLUCOSE SERPL-MCNC: 104 MG/DL (ref 70–99)
HCT VFR BLD CALC: 38 % (ref 36–47)
HGB BLD-MCNC: 12.4 G/DL (ref 12–15.5)
INFLUENZA A PATIENT: NEGATIVE
INFLUENZA B PATIENT: NEGATIVE
LIPASE: 374 U/L (ref 73–393)
LYMPHOCYTES # BLD: 1.1 X10^3/UL (ref 1–4.8)
LYMPHOCYTES NFR BLD AUTO: 27 % (ref 24–48)
MCH RBC QN AUTO: 26 PG (ref 25–35)
MCHC RBC AUTO-ENTMCNC: 33 G/DL (ref 31–37)
MCV RBC AUTO: 81 FL (ref 79–100)
MONO #: 0.3 X10^3/UL (ref 0–1.1)
MONOCYTES NFR BLD: 8 % (ref 0–9)
NEUT #: 2.6 X10^3/UL (ref 1.8–7.7)
NEUTROPHILS NFR BLD AUTO: 65 % (ref 31–73)
NITRITE UR QL STRIP: NEGATIVE
PH UR STRIP: 6 [PH]
PLATELET # BLD AUTO: 233 X10^3/UL (ref 140–400)
POTASSIUM SERPL-SCNC: 3.9 MMOL/L (ref 3.5–5.1)
PROT SERPL-MCNC: 6.9 G/DL (ref 6.4–8.2)
PROT UR STRIP-MCNC: NEGATIVE MG/DL
RBC # BLD AUTO: 4.71 X10^6/UL (ref 3.5–5.4)
RBC #/AREA URNS HPF: 0 /HPF (ref 0–2)
SODIUM SERPL-SCNC: 132 MMOL/L (ref 136–145)
SQUAMOUS #/AREA URNS LPF: (no result) /LPF
UROBILINOGEN UR-MCNC: 1 MG/DL
WBC # BLD AUTO: 4 X10^3/UL (ref 4–11)
WBC #/AREA URNS HPF: 0 /HPF (ref 0–4)

## 2020-04-04 PROCEDURE — 84484 ASSAY OF TROPONIN QUANT: CPT

## 2020-04-04 PROCEDURE — 93005 ELECTROCARDIOGRAM TRACING: CPT

## 2020-04-04 PROCEDURE — 96375 TX/PRO/DX INJ NEW DRUG ADDON: CPT

## 2020-04-04 PROCEDURE — 74176 CT ABD & PELVIS W/O CONTRAST: CPT

## 2020-04-04 PROCEDURE — 85025 COMPLETE CBC W/AUTO DIFF WBC: CPT

## 2020-04-04 PROCEDURE — 96374 THER/PROPH/DIAG INJ IV PUSH: CPT

## 2020-04-04 PROCEDURE — 82436 ASSAY OF URINE CHLORIDE: CPT

## 2020-04-04 PROCEDURE — 83690 ASSAY OF LIPASE: CPT

## 2020-04-04 PROCEDURE — 83935 ASSAY OF URINE OSMOLALITY: CPT

## 2020-04-04 PROCEDURE — 99285 EMERGENCY DEPT VISIT HI MDM: CPT

## 2020-04-04 PROCEDURE — 83930 ASSAY OF BLOOD OSMOLALITY: CPT

## 2020-04-04 PROCEDURE — 81001 URINALYSIS AUTO W/SCOPE: CPT

## 2020-04-04 PROCEDURE — 80053 COMPREHEN METABOLIC PANEL: CPT

## 2020-04-04 PROCEDURE — 87635 SARS-COV-2 COVID-19 AMP PRB: CPT

## 2020-04-04 PROCEDURE — 82570 ASSAY OF URINE CREATININE: CPT

## 2020-04-04 PROCEDURE — 36415 COLL VENOUS BLD VENIPUNCTURE: CPT

## 2020-04-04 PROCEDURE — 84300 ASSAY OF URINE SODIUM: CPT

## 2020-04-04 PROCEDURE — 71045 X-RAY EXAM CHEST 1 VIEW: CPT

## 2020-04-04 PROCEDURE — 85379 FIBRIN DEGRADATION QUANT: CPT

## 2020-04-04 PROCEDURE — 96361 HYDRATE IV INFUSION ADD-ON: CPT

## 2020-04-04 PROCEDURE — 84145 PROCALCITONIN (PCT): CPT

## 2020-04-04 PROCEDURE — 87804 INFLUENZA ASSAY W/OPTIC: CPT

## 2020-04-04 PROCEDURE — P9612 CATHETERIZE FOR URINE SPEC: HCPCS

## 2020-04-04 PROCEDURE — G0378 HOSPITAL OBSERVATION PER HR: HCPCS

## 2020-04-04 PROCEDURE — 82728 ASSAY OF FERRITIN: CPT

## 2020-04-04 PROCEDURE — 84133 ASSAY OF URINE POTASSIUM: CPT

## 2020-04-04 RX ADMIN — POTASSIUM CHLORIDE SCH MEQ: 1500 TABLET, EXTENDED RELEASE ORAL at 20:58

## 2020-04-04 RX ADMIN — LOSARTAN POTASSIUM SCH MG: 50 TABLET ORAL at 16:06

## 2020-04-04 RX ADMIN — BACITRACIN SCH MLS/HR: 5000 INJECTION, POWDER, FOR SOLUTION INTRAMUSCULAR at 13:41

## 2020-04-04 RX ADMIN — ATORVASTATIN CALCIUM SCH MG: 20 TABLET, FILM COATED ORAL at 20:58

## 2020-04-04 RX ADMIN — ACETAMINOPHEN PRN MG: 325 TABLET, FILM COATED ORAL at 19:38

## 2020-04-04 RX ADMIN — ACETAMINOPHEN PRN MG: 325 TABLET, FILM COATED ORAL at 17:43

## 2020-04-04 RX ADMIN — BACITRACIN SCH MLS/HR: 5000 INJECTION, POWDER, FOR SOLUTION INTRAMUSCULAR at 21:18

## 2020-04-04 RX ADMIN — ASPIRIN SCH MG: 81 TABLET, COATED ORAL at 13:41

## 2020-04-04 NOTE — PHYS DOC
Past Medical History


Past Medical History:  Hypertension, Other


Additional Past Medical Histor:  HYPOKALEMIA


Past Surgical History:  Hysterectomy


Additional Past Surgical Histo:  exploratory abdominal surgery


Smoking Status:  Never Smoker


Alcohol Use:  None


Drug Use:  None





Adult General


Chief Complaint


Chief Complaint:  FEVER





HPI


HPI


Patient is a 75  year old female with recent known coronavirus exposure at local

[employer] who presents with 2 weeks of generalized weakness, malaise, weakness with

24 hours of nausea vomiting and multiple loose OTC stools. Reports decreased 

appetite and oral intake and feeling generally weak. Reports subjective fever, 

denies chills sweats. No chest pain cough, sore throat, reports cramping 

abdominal pain. No urinary frequency urgency or dysuria[]





Review of Systems


Review of Systems


ROS as per HPI





All other systems were reviewed and found to be within normal limits, except as 

documented in this note.





Current Medications


Current Medications





Current Medications








 Medications


  (Trade)  Dose


 Ordered  Sig/Ness  Start Time


 Stop Time Status Last Admin


Dose Admin


 


 Azithromycin  250 ml @ 


 250 mls/hr  1X  ONCE  4/4/20 11:15


 4/4/20 12:14   





 


 Ceftriaxone Sodium


  (Rocephin)  1 gm  1X  ONCE  4/4/20 11:15


 4/4/20 11:16   





 


 Ondansetron HCl


  (Zofran)  4 mg  1X  ONCE  4/4/20 08:15


 4/4/20 08:18 DC 4/4/20 08:51


4 MG


 


 Sodium Chloride  500 ml @ 


 500 mls/hr  1X  ONCE  4/4/20 08:15


 4/4/20 09:14 DC 4/4/20 08:51


500 MLS/HR











Allergies


Allergies





Allergies








Coded Allergies Type Severity Reaction Last Updated Verified


 


  ACE Inhibitors Allergy Severe SWELLING ON FACE /THROAT 7/7/15 Yes


 


  codeine Adverse Reaction Intermediate nausea/vomitting 7/7/15 Yes











Physical Exam


Physical Exam





Constitutional: Well developed, well nourished, generally weak and fatigued 

appearing. []


HENT: Normocephalic, atraumatic, bilateral external ears normal, nose normal. []


Eyes: PERRLA, EOMI, conjunctiva normal. [] 


Neck: Normal range of motion, no tenderness. [] 


Cardiovascular:Heart rate regular rhythm, no murmur []


Lungs & Thorax:  Bilateral breath sounds clear to auscultation with diminished 

breath sounds bilaterally. []


Abdomen: Bowel sounds normal, soft, no tenderness. [] 


Skin: Warm, dry, no erythema, no rash. [] 


Back: No tenderness. [] 


Extremities: No tenderness, no edema. [] 


Neurologic: Alert and oriented X 3, normal motor function, normal sensory 

function, no focal deficits noted. []


Psychologic: Affect normal, judgement normal, mood normal. []





Current Patient Data


Vital Signs





                                   Vital Signs








  Date Time  Temp Pulse Resp B/P (MAP) Pulse Ox O2 Delivery O2 Flow Rate FiO2


 


4/4/20 08:05 99.3 96 22 137/66 (89)  Room Air 97.0 





 99.3       








Lab Values





                                Laboratory Tests








Test


 4/4/20


08:00 4/4/20


08:50


 


White Blood Count


 4.0 x10^3/uL


(4.0-11.0) 





 


Red Blood Count


 4.71 x10^6/uL


(3.50-5.40) 





 


Hemoglobin


 12.4 g/dL


(12.0-15.5) 





 


Hematocrit


 38.0 %


(36.0-47.0) 





 


Mean Corpuscular Volume


 81 fL ()


 





 


Mean Corpuscular Hemoglobin 26 pg (25-35)   


 


Mean Corpuscular Hemoglobin


Concent 33 g/dL


(31-37) 





 


Red Cell Distribution Width


 14.8 %


(11.5-14.5)  H 





 


Platelet Count


 233 x10^3/uL


(140-400) 





 


Neutrophils (%) (Auto) 65 % (31-73)   


 


Lymphocytes (%) (Auto) 27 % (24-48)   


 


Monocytes (%) (Auto) 8 % (0-9)   


 


Eosinophils (%) (Auto) 0 % (0-3)   


 


Basophils (%) (Auto) 1 % (0-3)   


 


Neutrophils # (Auto)


 2.6 x10^3/uL


(1.8-7.7) 





 


Lymphocytes # (Auto)


 1.1 x10^3/uL


(1.0-4.8) 





 


Monocytes # (Auto)


 0.3 x10^3/uL


(0.0-1.1) 





 


Eosinophils # (Auto)


 0.0 x10^3/uL


(0.0-0.7) 





 


Basophils # (Auto)


 0.0 x10^3/uL


(0.0-0.2) 





 


D-Dimer (Ghislaine)


 1.81 ug/mlFEU


(0.00-0.50)  H 





 


Ferritin


 942 ng/mL


(8-252)  H 





 


Troponin I Quantitative


 < 0.017 ng/mL


(0.000-0.055) 





 


Procalcitonin


 < 0.10 ng/mL


(0.00-0.10) 





 


Influenza Type A Antigen


 Negative


(NEGATIVE) 





 


Influenza Type B Antigen


 Negative


(NEGATIVE) 





 


Sodium Level


 


 132 mmol/L


(136-145)  L


 


Potassium Level


 


 3.9 mmol/L


(3.5-5.1)


 


Chloride Level


 


 100 mmol/L


()


 


Carbon Dioxide Level


 


 26 mmol/L


(21-32)


 


Anion Gap  6 (6-14)  


 


Blood Urea Nitrogen


 


 21 mg/dL


(7-20)  H


 


Creatinine


 


 1.5 mg/dL


(0.6-1.0)  H


 


Estimated GFR


(Cockcroft-Gault) 


 41.0  





 


BUN/Creatinine Ratio  14 (6-20)  


 


Glucose Level


 


 104 mg/dL


(70-99)  H


 


Calcium Level


 


 8.0 mg/dL


(8.5-10.1)  L


 


Total Bilirubin


 


 0.5 mg/dL


(0.2-1.0)


 


Aspartate Amino Transferase


(AST) 


 34 U/L (15-37)





 


Alanine Aminotransferase (ALT)


 


 18 U/L (14-59)





 


Alkaline Phosphatase


 


 52 U/L


()


 


Total Protein


 


 6.9 g/dL


(6.4-8.2)


 


Albumin


 


 3.1 g/dL


(3.4-5.0)  L


 


Albumin/Globulin Ratio


 


 0.8 (1.0-1.7)


L


 


Lipase


 


 374 U/L


()





                                Laboratory Tests


4/4/20 08:00








                                Laboratory Tests


4/4/20 08:50














EKG


EKG


[]





Radiology/Procedures


Radiology/Procedures


[Chest x-ray: Bilateral lower lobe infiltrates/atelectasis.





CT Abdomen pelvis: Groundglass appearance of lower lobes concerning for 

multilobar pneumonia.]





Course & Med Decision Making


Course & Med Decision Making


Pertinent Labs and Imaging studies reviewed. (See chart for details)





[Patient with GI and respiratory symptoms low-grade fever with recent known, 

with known COVID exposure. On imaging studies really suggestive of both 

coronavirus infection. IV fluids antibiotics given. COVID test pending. Will 

admit to the hospitalist service.]





Dragon Disclaimer


Dragon Disclaimer


This electronic medical record was generated, in whole or in part, using a voice

recognition dictation system.





Departure


Departure


Impression:  


   Primary Impression:  


   Pulmonary infiltrate


   Additional Impression:  


   Acute kidney injury


Disposition:  09 ADMITTED AS INPATIENT


Condition:  STABLE


Referrals:  


KHADRA GARCIA MD (PCP)





Problem Qualifiers











SHELBI HICKMAN DO                    Apr 4, 2020 09:40

## 2020-04-04 NOTE — PDOC1
History and Physical


Date of Admission


Date of Admission


4/4/2020





Identification/Chief Complaint


Chief Complaint


I feel sick





Source


Source:  Chart review, Patient





History of Present Illness


History of Present Illness


Patient is a 75 year old female who was in her usual state health until 2 weeks 

prior to her admission when she started developing generalized weakness malaise 

and overall sensation of not wellbeing.  The patient apparently went to a 

gathering to her local [employer] to pray for the current events.  Most likely she 

got exposed to someone who may have been already infected at that time and 

developed symptoms not even 4 days after the encounter.  He describes subjective

fevers dry cough no sneezing no sinus pain no odynophagia.  The patient denied 

pleurisy she denied diaphoresis no weight loss no inflamed lymph nodes reported.

 The patient did have nausea and had also loose stools reported especially over 

the last 24 hours prior to her visit to the emergency department.  The patient 

has had decreased oral intake due to her symptoms and her appetite was also 

affected being almost no.  Patient denies slurred speech she did lose 

consciousness approximately 1 week prior to her ER visit most likely a 

consequence of poor oral intake and dehydration.  The patient denied 

lightheadedness no palpitations no chest pain has been reported the patient 

denies postictal period. SHe did not present seizure like activity.  Due to the 

progressive nature of her symptoms she decided to consult today.  She is being 

admitted for a COVID 19 rule out











``





Past Medical History


Cardiovascular:  HTN


Pulmonary:  No pertinent hx


CENTRAL NERVOUS SYSTEM:  Other


GI:  GERD


Heme/Onc:  No pertinent hx


Psych:  No pertinent hx


Rheumatologic:  No pertinent hx


Infectious disease:  No pertinent hx


Renal/:  No pertinent hx


Endocrine:  Osteopenia





Past Surgical History


Past Surgical History:  Hysterectomy, Other





Family History


Family History:  Coronary Artery Disease





Social History


ALCOHOL:  none


Drugs:  None





Current Problem List


Problem List


Problems


Medical Problems:


(1) Acute kidney injury


Status: Acute  





(2) Pulmonary infiltrate


Status: Acute  











Current Medications


Current Medications





Current Medications








 Medications


  (Trade)  Dose


 Ordered  Sig/Ness  Start Time


 Stop Time Status Last Admin


Dose Admin


 


 Acetaminophen


  (Tylenol)  650 mg  PRN Q4HRS  PRN  4/4/20 11:30


     





 


 Albuterol Sulfate


  (Ventolin Neb


 Soln)  2.5 mg  PRN Q4HRS  PRN  4/4/20 11:30


     





 


 Amlodipine


 Besylate


  (Norvasc)  10 mg  DAILY  4/4/20 13:00


    4/4/20 13:42


10 MG


 


 Aspirin


  (Ecotrin)  81 mg  DAILY  4/4/20 13:00


    4/4/20 13:41


81 MG


 


 Atorvastatin


 Calcium


  (Lipitor)  20 mg  QHS  4/4/20 21:00


     





 


 Azithromycin  250 ml @ 


 250 mls/hr  1X  ONCE  4/4/20 11:15


 4/4/20 12:14 DC 4/4/20 11:41


250 MLS/HR


 


 Ceftriaxone Sodium


  (Rocephin)  1 gm  1X  ONCE  4/4/20 11:15


 4/4/20 11:16 DC 4/4/20 11:40


1 GM


 


 Docusate Sodium


  (Colace)  100 mg  PRN BID  PRN  4/4/20 11:30


     





 


 Ergocalciferol


  (Vitamin D2)  50,000 unit  Sa  4/4/20 15:00


    4/4/20 16:06


50,000 UNIT


 


 Guaifenesin


  (Robitussin)  200 mg  PRN Q4HRS  PRN  4/4/20 11:30


     





 


 Hydrochlorothiazide


  (Microzide)  12.5 mg  DAILY  4/4/20 15:00


    4/4/20 16:06


12.5 MG


 


 Lorazepam


  (Ativan)  0.5 mg  PRN Q4HRS  PRN  4/4/20 11:30


     





 


 Losartan Potassium


  (Cozaar)  100 mg  DAILY  4/4/20 15:00


    4/4/20 16:06


100 MG


 


 Ondansetron HCl


  (Zofran)  4 mg  1X  ONCE  4/4/20 08:15


 4/4/20 08:18 DC 4/4/20 08:51


4 MG


 


 Potassium Chloride


  (Klor-Con)  20 meq  BID  4/4/20 21:00


     





 


 Sodium Chloride  1,000 ml @ 


 100 mls/hr  Q10H  4/4/20 11:18


    4/4/20 13:41


100 MLS/HR











Allergies


Allergies





Allergies








Coded Allergies Type Severity Reaction Last Updated Verified


 


  ACE Inhibitors Allergy Severe SWELLING ON FACE /THROAT 7/7/15 Yes


 


  codeine Adverse Reaction Intermediate nausea/vomitting 7/7/15 Yes











ROS


Review of System


CONSTITUTIONAL:        +fever and malaise


EYES:                          No recent changes


SKIN:               No rash or itching


CARDIOVASCULAR:     No chest pain, syncope, palpitations, or edema


RESPIRATORY:            NoSOB + cough


GASTROINTESTINAL:   + nausea, no vomiting or abdominal pain


NEUROLOGICAL:          +headaches and weakness


ENDOCRINE:               No cold or heat intolerance


GENITOURINARY:         No urgency or frequency of urination


MUSCULOSKELETAL:   No back pain or joint pain


LYMPHATICS:               No enlarged lymph nodes


PSYCHIATRIC:              No anxiety or depression





Physical Exam


Physical Exam


GEN.:    No apparent distress.  Alert and oriented.


HEENT:    Head is normocephalic, atraumatic


NECK:    Supple.  


LUNGS:    Clear to auscultation.


HEART:    RRR, S1, S2 present.  Peripheral pulses intact


ABDOMEN:    Soft, nontender.  Positive bowel sounds.


EXTREMITIES:    Without any cyanosis.    


NEUROLOGIC:     Normal speech, normal tone


PSYCHIATRIC:    Normal affect, normal mood.


SKIN:   No ulcerations





Vitals


Vitals





Vital Signs








  Date Time  Temp Pulse Resp B/P (MAP) Pulse Ox O2 Delivery O2 Flow Rate FiO2


 


4/4/20 16:06  80  132/68    


 


4/4/20 15:40 99.8  20   Room Air  





 99.8       


 


4/4/20 11:35     94   


 


4/4/20 08:05       97.0 











Labs


Labs





Laboratory Tests








Test


 4/4/20


08:00 4/4/20


08:14 4/4/20


08:50


 


White Blood Count


 4.0 x10^3/uL


(4.0-11.0) 


 





 


Red Blood Count


 4.71 x10^6/uL


(3.50-5.40) 


 





 


Hemoglobin


 12.4 g/dL


(12.0-15.5) 


 





 


Hematocrit


 38.0 %


(36.0-47.0) 


 





 


Mean Corpuscular Volume 81 fL ()   


 


Mean Corpuscular Hemoglobin 26 pg (25-35)   


 


Mean Corpuscular Hemoglobin


Concent 33 g/dL


(31-37) 


 





 


Red Cell Distribution Width


 14.8 %


(11.5-14.5) 


 





 


Platelet Count


 233 x10^3/uL


(140-400) 


 





 


Neutrophils (%) (Auto) 65 % (31-73)   


 


Lymphocytes (%) (Auto) 27 % (24-48)   


 


Monocytes (%) (Auto) 8 % (0-9)   


 


Eosinophils (%) (Auto) 0 % (0-3)   


 


Basophils (%) (Auto) 1 % (0-3)   


 


Neutrophils # (Auto)


 2.6 x10^3/uL


(1.8-7.7) 


 





 


Lymphocytes # (Auto)


 1.1 x10^3/uL


(1.0-4.8) 


 





 


Monocytes # (Auto)


 0.3 x10^3/uL


(0.0-1.1) 


 





 


Eosinophils # (Auto)


 0.0 x10^3/uL


(0.0-0.7) 


 





 


Basophils # (Auto)


 0.0 x10^3/uL


(0.0-0.2) 


 





 


D-Dimer (Ghislaine)


 1.81 ug/mlFEU


(0.00-0.50) 


 





 


Ferritin


 942 ng/mL


(8-252) 


 





 


Troponin I Quantitative


 < 0.017 ng/mL


(0.000-0.055) 


 





 


Procalcitonin


 < 0.10 ng/mL


(0.00-0.10) 


 





 


Influenza Type A Antigen


 Negative


(NEGATIVE) 


 





 


Influenza Type B Antigen


 Negative


(NEGATIVE) 


 





 


Urine Collection Type  Unknown  


 


Urine Color  Yellow  


 


Urine Clarity  Clear  


 


Urine pH  6.0 (<5.0-8.0)  


 


Urine Specific Gravity


 


 1.015


(1.000-1.030) 





 


Urine Protein


 


 Negative mg/dL


(NEG-TRACE) 





 


Urine Glucose (UA)


 


 Negative mg/dL


(NEG) 





 


Urine Ketones (Stick)


 


 Negative mg/dL


(NEG) 





 


Urine Blood  Negative (NEG)  


 


Urine Nitrite  Negative (NEG)  


 


Urine Bilirubin  Negative (NEG)  


 


Urine Urobilinogen Dipstick


 


 1.0 mg/dL (0.2


mg/dL) 





 


Urine Leukocyte Esterase  Negative (NEG)  


 


Urine RBC  0 /HPF (0-2)  


 


Urine WBC  0 /HPF (0-4)  


 


Urine Squamous Epithelial


Cells 


 None /LPF 


 





 


Urine Amorphous Sediment  Present /HPF  


 


Urine Bacteria  0 /HPF (0-FEW)  


 


Sodium Level


 


 


 132 mmol/L


(136-145)


 


Potassium Level


 


 


 3.9 mmol/L


(3.5-5.1)


 


Chloride Level


 


 


 100 mmol/L


()


 


Carbon Dioxide Level


 


 


 26 mmol/L


(21-32)


 


Anion Gap   6 (6-14) 


 


Blood Urea Nitrogen


 


 


 21 mg/dL


(7-20)


 


Creatinine


 


 


 1.5 mg/dL


(0.6-1.0)


 


Estimated GFR


(Cockcroft-Gault) 


 


 41.0 





 


BUN/Creatinine Ratio   14 (6-20) 


 


Glucose Level


 


 


 104 mg/dL


(70-99)


 


Calcium Level


 


 


 8.0 mg/dL


(8.5-10.1)


 


Total Bilirubin


 


 


 0.5 mg/dL


(0.2-1.0)


 


Aspartate Amino Transf


(AST/SGOT) 


 


 34 U/L (15-37) 





 


Alanine Aminotransferase


(ALT/SGPT) 


 


 18 U/L (14-59) 





 


Alkaline Phosphatase


 


 


 52 U/L


()


 


Total Protein


 


 


 6.9 g/dL


(6.4-8.2)


 


Albumin


 


 


 3.1 g/dL


(3.4-5.0)


 


Albumin/Globulin Ratio   0.8 (1.0-1.7) 


 


Lipase


 


 


 374 U/L


()








Laboratory Tests








Test


 4/4/20


08:00 4/4/20


08:14 4/4/20


08:50


 


White Blood Count


 4.0 x10^3/uL


(4.0-11.0) 


 





 


Red Blood Count


 4.71 x10^6/uL


(3.50-5.40) 


 





 


Hemoglobin


 12.4 g/dL


(12.0-15.5) 


 





 


Hematocrit


 38.0 %


(36.0-47.0) 


 





 


Mean Corpuscular Volume 81 fL ()   


 


Mean Corpuscular Hemoglobin 26 pg (25-35)   


 


Mean Corpuscular Hemoglobin


Concent 33 g/dL


(31-37) 


 





 


Red Cell Distribution Width


 14.8 %


(11.5-14.5) 


 





 


Platelet Count


 233 x10^3/uL


(140-400) 


 





 


Neutrophils (%) (Auto) 65 % (31-73)   


 


Lymphocytes (%) (Auto) 27 % (24-48)   


 


Monocytes (%) (Auto) 8 % (0-9)   


 


Eosinophils (%) (Auto) 0 % (0-3)   


 


Basophils (%) (Auto) 1 % (0-3)   


 


Neutrophils # (Auto)


 2.6 x10^3/uL


(1.8-7.7) 


 





 


Lymphocytes # (Auto)


 1.1 x10^3/uL


(1.0-4.8) 


 





 


Monocytes # (Auto)


 0.3 x10^3/uL


(0.0-1.1) 


 





 


Eosinophils # (Auto)


 0.0 x10^3/uL


(0.0-0.7) 


 





 


Basophils # (Auto)


 0.0 x10^3/uL


(0.0-0.2) 


 





 


D-Dimer (Ghislaine)


 1.81 ug/mlFEU


(0.00-0.50) 


 





 


Ferritin


 942 ng/mL


(8-252) 


 





 


Troponin I Quantitative


 < 0.017 ng/mL


(0.000-0.055) 


 





 


Procalcitonin


 < 0.10 ng/mL


(0.00-0.10) 


 





 


Influenza Type A Antigen


 Negative


(NEGATIVE) 


 





 


Influenza Type B Antigen


 Negative


(NEGATIVE) 


 





 


Urine Collection Type  Unknown  


 


Urine Color  Yellow  


 


Urine Clarity  Clear  


 


Urine pH  6.0 (<5.0-8.0)  


 


Urine Specific Gravity


 


 1.015


(1.000-1.030) 





 


Urine Protein


 


 Negative mg/dL


(NEG-TRACE) 





 


Urine Glucose (UA)


 


 Negative mg/dL


(NEG) 





 


Urine Ketones (Stick)


 


 Negative mg/dL


(NEG) 





 


Urine Blood  Negative (NEG)  


 


Urine Nitrite  Negative (NEG)  


 


Urine Bilirubin  Negative (NEG)  


 


Urine Urobilinogen Dipstick


 


 1.0 mg/dL (0.2


mg/dL) 





 


Urine Leukocyte Esterase  Negative (NEG)  


 


Urine RBC  0 /HPF (0-2)  


 


Urine WBC  0 /HPF (0-4)  


 


Urine Squamous Epithelial


Cells 


 None /LPF 


 





 


Urine Amorphous Sediment  Present /HPF  


 


Urine Bacteria  0 /HPF (0-FEW)  


 


Sodium Level


 


 


 132 mmol/L


(136-145)


 


Potassium Level


 


 


 3.9 mmol/L


(3.5-5.1)


 


Chloride Level


 


 


 100 mmol/L


()


 


Carbon Dioxide Level


 


 


 26 mmol/L


(21-32)


 


Anion Gap   6 (6-14) 


 


Blood Urea Nitrogen


 


 


 21 mg/dL


(7-20)


 


Creatinine


 


 


 1.5 mg/dL


(0.6-1.0)


 


Estimated GFR


(Cockcroft-Gault) 


 


 41.0 





 


BUN/Creatinine Ratio   14 (6-20) 


 


Glucose Level


 


 


 104 mg/dL


(70-99)


 


Calcium Level


 


 


 8.0 mg/dL


(8.5-10.1)


 


Total Bilirubin


 


 


 0.5 mg/dL


(0.2-1.0)


 


Aspartate Amino Transf


(AST/SGOT) 


 


 34 U/L (15-37) 





 


Alanine Aminotransferase


(ALT/SGPT) 


 


 18 U/L (14-59) 





 


Alkaline Phosphatase


 


 


 52 U/L


()


 


Total Protein


 


 


 6.9 g/dL


(6.4-8.2)


 


Albumin


 


 


 3.1 g/dL


(3.4-5.0)


 


Albumin/Globulin Ratio   0.8 (1.0-1.7) 


 


Lipase


 


 


 374 U/L


()











VTE Prophylaxis Ordered


VTE Prophylaxis Devices:  No


VTE Pharmacological Prophylaxi:  Yes





Assessment/Plan


Assessment/Plan


Acute viral infection, 2 weeks post exposure to a large crowd, high suspicion 

for COVID 19





Acute renal failure most likely vasomotor in nature





Hyponatremia secondary to low effective circulatory volume





Moderate dehydration





Right lower lobe atelectasis, 





Essential hypertension





History of dyslipidemia








Plan:


isolation as per protocol, awaiting for COVID 19 test


Zithromax every 24 hours x 3 days


IV fluid resuscitation


resume home meds


supportive and symptomatic relief of symptoms


labs in am


further recommendations based on clinical course. 


DVT prophylaxis: VERA Knox MD              Apr 4, 2020 17:21

## 2020-04-04 NOTE — RAD
EXAM: Abdomen and pelvis CT without intravenous contrast.

 

HISTORY: Nausea and vomiting.

 

TECHNIQUE: Computed tomographic images of the abdomen and pelvis were 

obtained without contrast. Multiplanar reformatting was performed.

 

*One or more of the following individualized dose reduction techniques 

were utilized for this examination:  

1. Automated exposure control.  

2. Adjustment of the mA and/or kV according to patient size.  

3. Use of iterative reconstruction technique.

 

COMPARISON: None.

 

FINDINGS: Evaluation of the lower thorax demonstrates nonspecific 

scattered groundglass and linear opacities within the right middle lobe, 

lingula and bilateral lower lobes. No pleural effusion is seen. The heart 

is normal in size. There is a small hiatal hernia.

 

There are multiple hypodense lesions throughout the liver, the largest of 

which is seen within the right hepatic lobe measuring 4.2 cm. The 

attenuation of the largest lesions favors a cystic etiology. There are few

coarse calcifications associated with the lesions within the superior 

liver along the falciform ligament and the inferior right hepatic lobe. 

These are difficult to assess in the absence of contrast.

 

There is no evidence of cholelithiasis or cholecystitis. The pancreas, 

spleen and adrenal glands are unremarkable. There is a suspected 1 cm cyst

within the anterior mid zone of the left kidney. There is no 

nephrolithiasis. There is no hydronephrosis.

 

There is a moderate amount of colonic stool. There is no evidence of bowel

obstruction. There is no free air. There is no lymphadenopathy. The aorta 

is normal in caliber. There are degenerative changes involving the spine. 

There is no suspicious osseous lesion.

 

IMPRESSION: 

1. Multiple hypodense lesions scattered throughout the liver, the largest 

of which demonstrate attenuation consistent with cysts. The smaller 

lesions are difficult to characterize in the absence of contrast. Follow 

up with a liver protocol CT or MRI can be performed for better 

characterization.

2. Suspected tiny left renal cyst.

4. Moderate colonic stool.

5. Nonspecific groundglass and linear opacities within the right middle 

lobe, lingula and bilateral lower lobes. The differential includes 

atelectasis as well as multifocal infiltrate.

 

Electronically signed by: Mary Carmen Griffiths MD (4/4/2020 10:45 AM) [facility]

## 2020-04-04 NOTE — RAD
CHEST AP ONLY

 

Clinical Indication: Shortness of air, fever, cough

 

Comparison:  AP chest 2/24/2017.

 

Findings: 

The cardiomediastinal silhouette is normal. There are mild bibasilar 

airspace opacities. Considerations include atelectasis or early pneumonia 

or asymmetric edema. There is no pneumothorax. No pleural effusion is 

appreciated. No acute bone abnormality. Degenerative arthropathy of the 

shoulders.

 

IMPRESSION: 

Mild bibasilar airspace disease.

 

 

Electronically signed by: Zane Meadows MD (4/4/2020 9:24 AM) [id number]

## 2020-04-05 VITALS — SYSTOLIC BLOOD PRESSURE: 103 MMHG | DIASTOLIC BLOOD PRESSURE: 49 MMHG

## 2020-04-05 VITALS — DIASTOLIC BLOOD PRESSURE: 50 MMHG | SYSTOLIC BLOOD PRESSURE: 113 MMHG

## 2020-04-05 VITALS — SYSTOLIC BLOOD PRESSURE: 144 MMHG | DIASTOLIC BLOOD PRESSURE: 68 MMHG

## 2020-04-05 VITALS — DIASTOLIC BLOOD PRESSURE: 57 MMHG | SYSTOLIC BLOOD PRESSURE: 115 MMHG

## 2020-04-05 VITALS — SYSTOLIC BLOOD PRESSURE: 104 MMHG | DIASTOLIC BLOOD PRESSURE: 51 MMHG

## 2020-04-05 VITALS — DIASTOLIC BLOOD PRESSURE: 55 MMHG | SYSTOLIC BLOOD PRESSURE: 111 MMHG

## 2020-04-05 VITALS — SYSTOLIC BLOOD PRESSURE: 133 MMHG | DIASTOLIC BLOOD PRESSURE: 52 MMHG

## 2020-04-05 LAB — UR POTASSIUM: 39.4 MMOL/L

## 2020-04-05 RX ADMIN — ACETAMINOPHEN PRN MG: 325 TABLET, FILM COATED ORAL at 09:46

## 2020-04-05 RX ADMIN — BACITRACIN SCH MLS/HR: 5000 INJECTION, POWDER, FOR SOLUTION INTRAMUSCULAR at 01:00

## 2020-04-05 RX ADMIN — HYDROXYCHLOROQUINE SULFATE SCH MG: 200 TABLET ORAL at 20:23

## 2020-04-05 RX ADMIN — BACITRACIN SCH MLS/HR: 5000 INJECTION, POWDER, FOR SOLUTION INTRAMUSCULAR at 16:39

## 2020-04-05 RX ADMIN — GUAIFENESIN PRN MG: 100 SOLUTION ORAL at 09:45

## 2020-04-05 RX ADMIN — AZITHROMYCIN MONOHYDRATE SCH MLS/HR: 500 INJECTION, POWDER, LYOPHILIZED, FOR SOLUTION INTRAVENOUS at 11:00

## 2020-04-05 RX ADMIN — GUAIFENESIN PRN MG: 100 SOLUTION ORAL at 20:23

## 2020-04-05 RX ADMIN — HYDROXYCHLOROQUINE SULFATE SCH MG: 200 TABLET ORAL at 16:39

## 2020-04-05 RX ADMIN — DOCUSATE SODIUM PRN MG: 100 CAPSULE, LIQUID FILLED ORAL at 20:23

## 2020-04-05 RX ADMIN — ATORVASTATIN CALCIUM SCH MG: 20 TABLET, FILM COATED ORAL at 20:24

## 2020-04-05 RX ADMIN — POTASSIUM CHLORIDE SCH MEQ: 1500 TABLET, EXTENDED RELEASE ORAL at 09:27

## 2020-04-05 RX ADMIN — ACETAMINOPHEN PRN MG: 325 TABLET, FILM COATED ORAL at 16:39

## 2020-04-05 RX ADMIN — POTASSIUM CHLORIDE SCH MEQ: 1500 TABLET, EXTENDED RELEASE ORAL at 20:24

## 2020-04-05 RX ADMIN — ACETAMINOPHEN PRN MG: 325 TABLET, FILM COATED ORAL at 20:25

## 2020-04-05 RX ADMIN — ASPIRIN SCH MG: 81 TABLET, COATED ORAL at 09:27

## 2020-04-05 RX ADMIN — LOSARTAN POTASSIUM SCH MG: 50 TABLET ORAL at 09:27

## 2020-04-05 NOTE — EKG
[facility]

              [address] [place] [address]

Test Date:    2020               Test Time:    12:01:43

Pat Name:     ANDREE ESTES             Department:   

Patient ID:   [id number]           Room:         648 1

Gender:       F                        Technician:   EK

:          1945               Requested By: VERA CHAN

Order Number: [id number]           Reading MD:   Sky Ibarra MD

                                 Measurements

Intervals                              Axis          

Rate:         70                       P:            31

NH:           164                      QRS:          -27

QRSD:         74                       T:            -8

QT:           412                                    

QTc:          448                                    

                           Interpretive Statements

SINUS RHYTHM

LEFTWARD AXIS

T ABNORMALITY IN ANTERIOR LEADS

ABNORMAL ECG



Electronically Signed On 2020 10:43:57 CDT by Sky Ibarra MD

## 2020-04-05 NOTE — PDOC
PROGRESS NOTES


Chief Complaint


Chief Complaint


Acute SARS-CoV-2 (COVID 19) infection - 2 weeks post exposure to a large crowd, 

plaquenil and azithromycin started


Acute renal failure most likely vasomotor nephropathy


Hyponatremia secondary to low effective circulatory volume


Moderate dehydration


Right lower lobe atelectasis, 


Essential hypertension


History of dyslipidemia





History of Present Illness


History of Present Illness


Ms Pires is a 75 year old female who was in her usual state health until 2 

weeks prior to her admission when she started developing generalized weakness 

malaise and overall sensation of not wellbeing.  The patient apparently went to 

a gathering to her local [employer] to pray for the current events.  Most likely she

got exposed to someone who may have been already infected at that time and 

developed symptoms not even 4 days after the encounter.  He describes subjective

fevers dry cough no sneezing no sinus pain no odynophagia.  The patient denied 

pleurisy she denied diaphoresis no weight loss no inflamed lymph nodes reported.

 The patient did have nausea and had also loose stools reported especially over 

the last 24 hours prior to her visit to the emergency department.  The patient 

has had decreased oral intake due to her symptoms and her appetite was also 

affected being almost no.  Patient denies slurred speech she did lose 

consciousness approximately 1 week prior to her ER visit most likely a 

consequence of poor oral intake and dehydration.  The patient denied 

lightheadedness no palpitations no chest pain has been reported the patient 

denies postictal period. SHe did not present seizure like activity.  Due to the 

progressive nature of her symptoms she decided to consult today.  She is being 

admitted for a COVID 19 positive pneumonia.





Vitals


Vitals





Vital Signs








  Date Time  Temp Pulse Resp B/P (MAP) Pulse Ox O2 Delivery O2 Flow Rate FiO2


 


4/5/20 09:28  64  133/52    


 


4/5/20 07:00 99.8  18  95 Room Air  





 99.8       


 


4/4/20 18:00       97.0 











Physical Exam


General:  Alert, Cooperative


Heart:  Regular rate


Lungs:  Wheezing


Abdomen:  Normal bowel sounds, Soft


Extremities:  No clubbing, No cyanosis


Skin:  No rashes, No breakdown





Labs


LABS





Laboratory Tests








Test


 4/4/20


10:35 4/4/20


16:30


 


Coronavirus (COVID-19)(PCR)


 See separate


report 





 


Urine Random Creatinine


 


 92.1 mg/dL


(Not Establ.)











Assessment and Plan


Assessmemt and Plan


Problems


Medical Problems:


(1) Acute kidney injury


Status: Acute  





(2) Pulmonary infiltrate


Status: Acute  











Comment


Review of Relevant


I have reviewed the following items li (where applicable) has been applied.


Labs





Laboratory Tests








Test


 4/4/20


08:00 4/4/20


08:14 4/4/20


08:50 4/4/20


10:35


 


White Blood Count


 4.0 x10^3/uL


(4.0-11.0) 


 


 





 


Red Blood Count


 4.71 x10^6/uL


(3.50-5.40) 


 


 





 


Hemoglobin


 12.4 g/dL


(12.0-15.5) 


 


 





 


Hematocrit


 38.0 %


(36.0-47.0) 


 


 





 


Mean Corpuscular Volume 81 fL ()    


 


Mean Corpuscular Hemoglobin 26 pg (25-35)    


 


Mean Corpuscular Hemoglobin


Concent 33 g/dL


(31-37) 


 


 





 


Red Cell Distribution Width


 14.8 %


(11.5-14.5) 


 


 





 


Platelet Count


 233 x10^3/uL


(140-400) 


 


 





 


Neutrophils (%) (Auto) 65 % (31-73)    


 


Lymphocytes (%) (Auto) 27 % (24-48)    


 


Monocytes (%) (Auto) 8 % (0-9)    


 


Eosinophils (%) (Auto) 0 % (0-3)    


 


Basophils (%) (Auto) 1 % (0-3)    


 


Neutrophils # (Auto)


 2.6 x10^3/uL


(1.8-7.7) 


 


 





 


Lymphocytes # (Auto)


 1.1 x10^3/uL


(1.0-4.8) 


 


 





 


Monocytes # (Auto)


 0.3 x10^3/uL


(0.0-1.1) 


 


 





 


Eosinophils # (Auto)


 0.0 x10^3/uL


(0.0-0.7) 


 


 





 


Basophils # (Auto)


 0.0 x10^3/uL


(0.0-0.2) 


 


 





 


D-Dimer (Ghislaine)


 1.81 ug/mlFEU


(0.00-0.50) 


 


 





 


Ferritin


 942 ng/mL


(8-252) 


 


 





 


Troponin I Quantitative


 < 0.017 ng/mL


(0.000-0.055) 


 


 





 


Procalcitonin


 < 0.10 ng/mL


(0.00-0.10) 


 


 





 


Influenza Type A Antigen


 Negative


(NEGATIVE) 


 


 





 


Influenza Type B Antigen


 Negative


(NEGATIVE) 


 


 





 


Urine Collection Type  Unknown   


 


Urine Color  Yellow   


 


Urine Clarity  Clear   


 


Urine pH  6.0 (<5.0-8.0)   


 


Urine Specific Gravity


 


 1.015


(1.000-1.030) 


 





 


Urine Protein


 


 Negative mg/dL


(NEG-TRACE) 


 





 


Urine Glucose (UA)


 


 Negative mg/dL


(NEG) 


 





 


Urine Ketones (Stick)


 


 Negative mg/dL


(NEG) 


 





 


Urine Blood  Negative (NEG)   


 


Urine Nitrite  Negative (NEG)   


 


Urine Bilirubin  Negative (NEG)   


 


Urine Urobilinogen Dipstick


 


 1.0 mg/dL (0.2


mg/dL) 


 





 


Urine Leukocyte Esterase  Negative (NEG)   


 


Urine RBC  0 /HPF (0-2)   


 


Urine WBC  0 /HPF (0-4)   


 


Urine Squamous Epithelial


Cells 


 None /LPF 


 


 





 


Urine Amorphous Sediment  Present /HPF   


 


Urine Bacteria  0 /HPF (0-FEW)   


 


Sodium Level


 


 


 132 mmol/L


(136-145) 





 


Potassium Level


 


 


 3.9 mmol/L


(3.5-5.1) 





 


Chloride Level


 


 


 100 mmol/L


() 





 


Carbon Dioxide Level


 


 


 26 mmol/L


(21-32) 





 


Anion Gap   6 (6-14)  


 


Blood Urea Nitrogen


 


 


 21 mg/dL


(7-20) 





 


Creatinine


 


 


 1.5 mg/dL


(0.6-1.0) 





 


Estimated GFR


(Cockcroft-Gault) 


 


 41.0 


 





 


BUN/Creatinine Ratio   14 (6-20)  


 


Glucose Level


 


 


 104 mg/dL


(70-99) 





 


Calcium Level


 


 


 8.0 mg/dL


(8.5-10.1) 





 


Total Bilirubin


 


 


 0.5 mg/dL


(0.2-1.0) 





 


Aspartate Amino Transf


(AST/SGOT) 


 


 34 U/L (15-37) 


 





 


Alanine Aminotransferase


(ALT/SGPT) 


 


 18 U/L (14-59) 


 





 


Alkaline Phosphatase


 


 


 52 U/L


() 





 


Total Protein


 


 


 6.9 g/dL


(6.4-8.2) 





 


Albumin


 


 


 3.1 g/dL


(3.4-5.0) 





 


Albumin/Globulin Ratio   0.8 (1.0-1.7)  


 


Lipase


 


 


 374 U/L


() 





 


Coronavirus (COVID-19)(PCR)


 


 


 


 See separate


report


 


Test


 4/4/20


16:30 


 


 





 


Urine Random Creatinine


 92.1 mg/dL


(Not Establ.) 


 


 











Laboratory Tests








Test


 4/4/20


10:35 4/4/20


16:30


 


Coronavirus (COVID-19)(PCR)


 See separate


report 





 


Urine Random Creatinine


 


 92.1 mg/dL


(Not Establ.)








Medications





Current Medications


Sodium Chloride 500 ml @  500 mls/hr 1X  ONCE IV  Last administered on 4/4/20at 

08:51;  Start 4/4/20 at 08:15;  Stop 4/4/20 at 09:14;  Status DC


Ondansetron HCl (Zofran) 4 mg 1X  ONCE IVP  Last administered on 4/4/20at 08:51;

 Start 4/4/20 at 08:15;  Stop 4/4/20 at 08:18;  Status DC


Azithromycin 250 ml @  250 mls/hr 1X  ONCE IV  Last administered on 4/4/20at 

11:41;  Start 4/4/20 at 11:15;  Stop 4/4/20 at 12:14;  Status DC


Ceftriaxone Sodium (Rocephin) 1 gm 1X  ONCE IVP  Last administered on 4/4/20at 

11:40;  Start 4/4/20 at 11:15;  Stop 4/4/20 at 11:16;  Status DC


Sodium Chloride 1,000 ml @  100 mls/hr Q10H IV  Last administered on 4/5/20at 

01:00;  Start 4/4/20 at 11:18


Acetaminophen (Tylenol) 650 mg PRN Q4HRS  PRN PO TEMP OVER 100.4F OR MILD PAIN 

Last administered on 4/4/20at 19:38;  Start 4/4/20 at 11:30


Docusate Sodium (Colace) 100 mg PRN BID  PRN PO CONSTIPATION;  Start 4/4/20 at 

11:30


Albuterol Sulfate (Ventolin Neb Soln) 2.5 mg PRN Q4HRS  PRN NEB SHORTNESS OF 

BREATH;  Start 4/4/20 at 11:30


Guaifenesin (Robitussin) 200 mg PRN Q4HRS  PRN PO COUGH;  Start 4/4/20 at 11:30


Lorazepam (Ativan) 0.5 mg PRN Q4HRS  PRN PO ANXIETY / AGITATION;  Start 4/4/20 

at 11:30


Amlodipine Besylate (Norvasc) 10 mg DAILY PO  Last administered on 4/5/20at 

09:28;  Start 4/4/20 at 13:00


Aspirin (Ecotrin) 81 mg DAILY PO  Last administered on 4/5/20at 09:27;  Start 

4/4/20 at 13:00


Atorvastatin Calcium (Lipitor) 20 mg QHS PO  Last administered on 4/4/20at 

20:58;  Start 4/4/20 at 21:00


Potassium Chloride (Klor-Con) 20 meq BID PO  Last administered on 4/5/20at 

09:27;  Start 4/4/20 at 21:00


Losartan Potassium (Cozaar) 100 mg DAILY PO  Last administered on 4/5/20at 

09:27;  Start 4/4/20 at 15:00


Ergocalciferol (Vitamin D2) 50,000 unit Sa PO  Last administered on 4/4/20at 

16:06;  Start 4/4/20 at 15:00


Hydrochlorothiazide (Microzide) 12.5 mg DAILY PO  Last administered on 4/5/20at 

09:26;  Start 4/4/20 at 15:00


Azithromycin 500 mg/Sodium Chloride 250 ml @  250 mls/hr Q24H IV ;  Start 4/5/20

at 11:00





Active Scripts


Active


Reported


Lipitor (Atorvastatin Calcium) 20 Mg Tablet 1 Tab PO QHS


Aspir 81 (Aspirin) 81 Mg Tablet.dr 1 Tab PO DAILY


Fosamax (Alendronate Sodium) 70 Mg Tablet 1 Tab PO WEEKLY


Klor-Con M20 (Potassium Chloride) 20 Meq Tab.er.prt 20 Meq PO BID


Amlodipine Besylate 10 Mg Tablet 10 Mg PO DAILY


Vitals/I & O





Vital Sign - Last 24 Hours








 4/4/20 4/4/20 4/4/20 4/4/20





 10:30 11:05 11:35 13:42


 


Pulse 68 68 68 68


 


B/P (MAP) 126/69 (88) 136/62 (86) 135/63 (87) 135/63


 


Pulse Ox 93 93 94 


 


O2 Delivery Room Air Room Air Room Air 





 4/4/20 4/4/20 4/4/20 4/4/20





 15:40 16:06 18:00 19:00


 


Temp 99.8   102.6





 99.8   102.6


 


Pulse 80 80  78


 


Resp 20   20


 


B/P (MAP) 132/68 (89) 132/68  126/64 (84)


 


Pulse Ox    95


 


O2 Delivery Room Air  Room Air 


 


O2 Flow Rate   97.0 


 


    





    





 4/4/20 4/4/20 4/5/20 4/5/20





 19:10 23:00 03:00 04:29


 


Temp  97.7 97.9 97.7





  97.7 97.9 97.7


 


Pulse  64 70 64


 


Resp  18 18 18


 


B/P (MAP)  103/49 (67) 111/55 (73) 103/49 (67)


 


Pulse Ox  95 95 95


 


O2 Delivery Room Air   Room Air


 


    





    





 4/5/20 4/5/20 4/5/20 





 07:00 09:27 09:28 


 


Temp 99.8   





 99.8   


 


Pulse 64 64 64 


 


Resp 18   


 


B/P (MAP) 133/52 (79) 133/52 133/52 


 


Pulse Ox 95   


 


O2 Delivery Room Air   














Intake and Output   


 


 4/4/20 4/4/20 4/5/20





 15:00 23:00 07:00


 


Intake Total  250 ml 1656 ml


 


Balance  250 ml 1656 ml

















ZACKERY HERNANDEZ MD         Apr 5, 2020 09:41

## 2020-04-06 VITALS — DIASTOLIC BLOOD PRESSURE: 58 MMHG | SYSTOLIC BLOOD PRESSURE: 135 MMHG

## 2020-04-06 VITALS — DIASTOLIC BLOOD PRESSURE: 56 MMHG | SYSTOLIC BLOOD PRESSURE: 112 MMHG

## 2020-04-06 VITALS — SYSTOLIC BLOOD PRESSURE: 114 MMHG | DIASTOLIC BLOOD PRESSURE: 63 MMHG

## 2020-04-06 VITALS — SYSTOLIC BLOOD PRESSURE: 119 MMHG | DIASTOLIC BLOOD PRESSURE: 60 MMHG

## 2020-04-06 VITALS — SYSTOLIC BLOOD PRESSURE: 119 MMHG | DIASTOLIC BLOOD PRESSURE: 62 MMHG

## 2020-04-06 VITALS — SYSTOLIC BLOOD PRESSURE: 102 MMHG | DIASTOLIC BLOOD PRESSURE: 37 MMHG

## 2020-04-06 RX ADMIN — LOSARTAN POTASSIUM SCH MG: 50 TABLET ORAL at 08:58

## 2020-04-06 RX ADMIN — CEFTRIAXONE SCH GM: 1 INJECTION, POWDER, FOR SOLUTION INTRAMUSCULAR; INTRAVENOUS at 15:00

## 2020-04-06 RX ADMIN — BACITRACIN SCH MLS/HR: 5000 INJECTION, POWDER, FOR SOLUTION INTRAMUSCULAR at 13:18

## 2020-04-06 RX ADMIN — HYDROXYCHLOROQUINE SULFATE SCH MG: 200 TABLET ORAL at 20:20

## 2020-04-06 RX ADMIN — POTASSIUM CHLORIDE SCH MEQ: 1500 TABLET, EXTENDED RELEASE ORAL at 08:58

## 2020-04-06 RX ADMIN — HYDROXYCHLOROQUINE SULFATE SCH MG: 200 TABLET ORAL at 08:59

## 2020-04-06 RX ADMIN — GUAIFENESIN PRN MG: 100 SOLUTION ORAL at 18:14

## 2020-04-06 RX ADMIN — POTASSIUM CHLORIDE SCH MEQ: 1500 TABLET, EXTENDED RELEASE ORAL at 20:20

## 2020-04-06 RX ADMIN — BACITRACIN SCH MLS/HR: 5000 INJECTION, POWDER, FOR SOLUTION INTRAMUSCULAR at 04:12

## 2020-04-06 RX ADMIN — ASPIRIN SCH MG: 81 TABLET, COATED ORAL at 08:59

## 2020-04-06 RX ADMIN — ATORVASTATIN CALCIUM SCH MG: 20 TABLET, FILM COATED ORAL at 20:20

## 2020-04-06 RX ADMIN — ACETAMINOPHEN PRN MG: 325 TABLET, FILM COATED ORAL at 08:59

## 2020-04-06 RX ADMIN — BACITRACIN SCH MLS/HR: 5000 INJECTION, POWDER, FOR SOLUTION INTRAMUSCULAR at 20:21

## 2020-04-06 RX ADMIN — AZITHROMYCIN MONOHYDRATE SCH MLS/HR: 500 INJECTION, POWDER, LYOPHILIZED, FOR SOLUTION INTRAVENOUS at 09:00

## 2020-04-06 NOTE — PDOC
[facility] PROGRESS NOTE


Chief Complaint


Chief Complaint


Acute SARS-CoV-2 (COVID 19) infection - 2 weeks post exposure to a large crowd, 

plaquenil and azithromycin started


Acute renal failure most likely vasomotor nephropathy


Hyponatremia secondary to low effective circulatory volume


Moderate dehydration


Right lower lobe atelectasis, 


Essential hypertension


History of dyslipidemia





History of Present Illness


History of Present Illness


4-6-2020


Patient seen and examined


She remains very ill


In respiratory isolation for Covid-19


Discussed with RN


Chart reviewed





Ms Pires is a 75 year old female who was in her usual state health until 2 

weeks prior to her admission when she started developing generalized weakness 

malaise and overall sensation of not wellbeing.  The patient apparently went to 

a gathering to her local [employer] to pray for the current events.  Most likely she

got exposed to someone who may have been already infected at that time and 

developed symptoms not even 4 days after the encounter.  He describes subjective

fevers dry cough no sneezing no sinus pain no odynophagia.  The patient denied 

pleurisy she denied diaphoresis no weight loss no inflamed lymph nodes reported.

 The patient did have nausea and had also loose stools reported especially over 

the last 24 hours prior to her visit to the emergency department.  The patient 

has had decreased oral intake due to her symptoms and her appetite was also 

affected being almost no.  Patient denies slurred speech she did lose 

consciousness approximately 1 week prior to her ER visit most likely a 

consequence of poor oral intake and dehydration.  The patient denied 

lightheadedness no palpitations no chest pain has been reported the patient 

denies postictal period. SHe did not present seizure like activity.  Due to the 

progressive nature of her symptoms she decided to consult today.  She is being 

admitted for a COVID 19 positive pneumonia.





Vitals/I&O


Vitals/I&O:





                                   Vital Signs








  Date Time  Temp Pulse Resp B/P (MAP) Pulse Ox O2 Delivery O2 Flow Rate FiO2


 


4/6/20 11:00 102.0 84  114/63 (80) 94 Room Air  





 102.0       


 


4/6/20 03:00   20     


 


4/5/20 08:00       97.0 














                                    I & O   


 


 4/5/20 4/5/20 4/6/20





 15:00 23:00 07:00


 


Intake Total  800 ml 


 


Output Total   2600 ml


 


Balance  800 ml -2600 ml











Physical Exam


General:  Alert, Cooperative, moderate distress


Heart:  Regular rate


Lungs:  Wheezing, Crackles


Abdomen:  Normal bowel sounds, Soft


Extremities:  No clubbing, No cyanosis


Skin:  No rashes, No breakdown





Assessment and Plan


Assessmemt and Plan


Problems


Medical Problems:


(1) Acute kidney injury


Status: Acute  





(2) Pulmonary infiltrate


Status: Acute  





Acute SARS-CoV-2 (COVID 19) infection - 2 weeks post exposure to a large crowd, 

plaquenil and azithromycin started


Acute renal failure most likely vasomotor nephropathy


Hyponatremia secondary to low effective circulatory volume


Moderate dehydration


Right lower lobe atelectasis, 


Essential hypertension


History of dyslipidemia





Plan


IV antibiotics


Duo nebs


O2


Home meds


DVT prophylaxis


Full code


Appreciate subspecialty input


COVID-19 CRITERIA:    The patient was evaluated during the global COVID-19 

pandemic, and that diagnosis was suspected/considered upon their initial 

presentation.  Their evaluation, treatment and testing was consistent with 

current guidelines for patients who present with complaints or symptoms that may

be related to COVID-19.


Total time 32





Comment


Review of Relevant


I have reviewed the following items li (where applicable) has been applied.


Medications:





Current Medications








 Medications


  (Trade)  Dose


 Ordered  Sig/Ness


 Route


 PRN Reason  Start Time


 Stop Time Status Last Admin


Dose Admin


 


 Hydroxychloroquine


 Sulfate


  (Plaquenil (Med


 Program))  400 mg  BID


 PO


   4/5/20 16:00


 4/5/20 21:01 DC 4/5/20 20:23





 


 Hydroxychloroquine


 Sulfate


  (Plaquenil (Med


 Program))  200 mg  BID


 PO


   4/6/20 09:00


 4/9/20 21:01  4/6/20 08:59




















NICO CAMPBELL III DO            Apr 6, 2020 12:38

## 2020-04-07 VITALS — SYSTOLIC BLOOD PRESSURE: 121 MMHG | DIASTOLIC BLOOD PRESSURE: 60 MMHG

## 2020-04-07 VITALS — DIASTOLIC BLOOD PRESSURE: 78 MMHG | SYSTOLIC BLOOD PRESSURE: 164 MMHG

## 2020-04-07 VITALS — DIASTOLIC BLOOD PRESSURE: 77 MMHG | SYSTOLIC BLOOD PRESSURE: 152 MMHG

## 2020-04-07 VITALS — SYSTOLIC BLOOD PRESSURE: 130 MMHG | DIASTOLIC BLOOD PRESSURE: 61 MMHG

## 2020-04-07 VITALS — SYSTOLIC BLOOD PRESSURE: 116 MMHG | DIASTOLIC BLOOD PRESSURE: 56 MMHG

## 2020-04-07 VITALS — SYSTOLIC BLOOD PRESSURE: 106 MMHG | DIASTOLIC BLOOD PRESSURE: 49 MMHG

## 2020-04-07 RX ADMIN — ATORVASTATIN CALCIUM SCH MG: 20 TABLET, FILM COATED ORAL at 20:14

## 2020-04-07 RX ADMIN — POTASSIUM CHLORIDE SCH MEQ: 1500 TABLET, EXTENDED RELEASE ORAL at 08:35

## 2020-04-07 RX ADMIN — LOSARTAN POTASSIUM SCH MG: 50 TABLET ORAL at 08:36

## 2020-04-07 RX ADMIN — HYDROXYCHLOROQUINE SULFATE SCH MG: 200 TABLET ORAL at 20:14

## 2020-04-07 RX ADMIN — AZITHROMYCIN MONOHYDRATE SCH MLS/HR: 500 INJECTION, POWDER, LYOPHILIZED, FOR SOLUTION INTRAVENOUS at 08:38

## 2020-04-07 RX ADMIN — POTASSIUM CHLORIDE SCH MEQ: 1500 TABLET, EXTENDED RELEASE ORAL at 20:14

## 2020-04-07 RX ADMIN — GUAIFENESIN PRN MG: 100 SOLUTION ORAL at 08:36

## 2020-04-07 RX ADMIN — DOCUSATE SODIUM PRN MG: 100 CAPSULE, LIQUID FILLED ORAL at 08:35

## 2020-04-07 RX ADMIN — ACETAMINOPHEN PRN MG: 325 TABLET, FILM COATED ORAL at 08:35

## 2020-04-07 RX ADMIN — ASPIRIN SCH MG: 81 TABLET, COATED ORAL at 08:36

## 2020-04-07 RX ADMIN — CEFTRIAXONE SCH GM: 1 INJECTION, POWDER, FOR SOLUTION INTRAMUSCULAR; INTRAVENOUS at 14:29

## 2020-04-07 RX ADMIN — BACITRACIN SCH MLS/HR: 5000 INJECTION, POWDER, FOR SOLUTION INTRAMUSCULAR at 20:15

## 2020-04-07 RX ADMIN — HYDROXYCHLOROQUINE SULFATE SCH MG: 200 TABLET ORAL at 08:36

## 2020-04-07 RX ADMIN — BACITRACIN SCH MLS/HR: 5000 INJECTION, POWDER, FOR SOLUTION INTRAMUSCULAR at 08:37

## 2020-04-07 NOTE — PDOC
[facility] [facility] PROGRESS NOTE


Chief Complaint


Chief Complaint


Acute SARS-CoV-2 (COVID 19) infection - 2 weeks post exposure to a large crowd, 

plaquenil and azithromycin started


Acute renal failure most likely vasomotor nephropathy


Hyponatremia secondary to low effective circulatory volume


Moderate dehydration


Right lower lobe atelectasis, 


Essential hypertension


History of dyslipidemia





History of Present Illness


History of Present Illness


4-7-2020


Patient seen and examined on the Covid-19 unit


She remains in respiratory isolation


Chart reviewed


Discussed with RN








4-6-2020


Patient seen and examined


She remains very ill


In respiratory isolation for Covid-19


Discussed with RN


Chart reviewed





Ms Pires is a 75 year old female who was in her usual state health until 2 

weeks prior to her admission when she started developing generalized weakness 

malaise and overall sensation of not wellbeing.  The patient apparently went to 

a gathering to her local [employer] to pray for the current events.  Most likely she

got exposed to someone who may have been already infected at that time and 

developed symptoms not even 4 days after the encounter.  He describes subjective

fevers dry cough no sneezing no sinus pain no odynophagia.  The patient denied 

pleurisy she denied diaphoresis no weight loss no inflamed lymph nodes reported.

 The patient did have nausea and had also loose stools reported especially over 

the last 24 hours prior to her visit to the emergency department.  The patient 

has had decreased oral intake due to her symptoms and her appetite was also 

affected being almost no.  Patient denies slurred speech she did lose 

consciousness approximately 1 week prior to her ER visit most likely a 

consequence of poor oral intake and dehydration.  The patient denied 

lightheadedness no palpitations no chest pain has been reported the patient 

denies postictal period. SHe did not present seizure like activity.  Due to the 

progressive nature of her symptoms she decided to consult today.  She is being 

admitted for a COVID 19 positive pneumonia.





Vitals/I&O


Vitals/I&O:





                                   Vital Signs








  Date Time  Temp Pulse Resp B/P (MAP) Pulse Ox O2 Delivery O2 Flow Rate FiO2


 


4/7/20 10:33 98.6 75  130/61 (84) 95 Room Air  





 98.6       














                                    I & O   


 


 4/6/20 4/6/20 4/7/20





 15:00 23:00 07:00


 


Intake Total  350 ml 500 ml


 


Output Total 200 ml 1600 ml 


 


Balance -200 ml -1250 ml 500 ml











Physical Exam


General:  Alert, Cooperative, moderate distress


Heart:  Regular rate


Lungs:  Wheezing, Crackles


Abdomen:  Normal bowel sounds, Soft


Extremities:  No clubbing, No cyanosis


Skin:  No rashes, No breakdown





Assessment and Plan


Assessmemt and Plan


Problems


Medical Problems:


(1) Acute kidney injury


Status: Acute  





(2) Pulmonary infiltrate


Status: Acute  





Acute SARS-CoV-2 (COVID 19) infection - 2 weeks post exposure to a large crowd, 

plaquenil and azithromycin started


Acute renal failure most likely vasomotor nephropathy


Hyponatremia secondary to low effective circulatory volume


Moderate dehydration


Right lower lobe atelectasis, 


Essential hypertension


History of dyslipidemia





Plan


IV antibiotics


Duo nebs


O2


Home meds


DVT prophylaxis


Full code


Appreciate subspecialty input


COVID-19 CRITERIA:    The patient was evaluated during the global COVID-19 

pandemic, and that diagnosis was suspected/considered upon their initial 

presentation.  Their evaluation, treatment and testing was consistent with 

current guidelines for patients who present with complaints or symptoms that may

be related to COVID-19.


Hope to discharge by the end of the week if she is stable





Comment


Review of Relevant


I have reviewed the following items li (where applicable) has been applied.


Medications:





Current Medications








 Medications


  (Trade)  Dose


 Ordered  Sig/Ness


 Route


 PRN Reason  Start Time


 Stop Time Status Last Admin


Dose Admin


 


 Ceftriaxone Sodium


  (Rocephin)  1 gm  Q24H


 IVP


   4/6/20 15:00


    4/6/20 15:00




















NICO CAMPBELL III DO            Apr 7, 2020 12:43

## 2020-04-08 VITALS — DIASTOLIC BLOOD PRESSURE: 80 MMHG | SYSTOLIC BLOOD PRESSURE: 138 MMHG

## 2020-04-08 VITALS — SYSTOLIC BLOOD PRESSURE: 137 MMHG | DIASTOLIC BLOOD PRESSURE: 63 MMHG

## 2020-04-08 RX ADMIN — POTASSIUM CHLORIDE SCH MEQ: 1500 TABLET, EXTENDED RELEASE ORAL at 08:16

## 2020-04-08 RX ADMIN — LOSARTAN POTASSIUM SCH MG: 50 TABLET ORAL at 08:16

## 2020-04-08 RX ADMIN — HYDROXYCHLOROQUINE SULFATE SCH MG: 200 TABLET ORAL at 08:15

## 2020-04-08 RX ADMIN — BACITRACIN SCH MLS/HR: 5000 INJECTION, POWDER, FOR SOLUTION INTRAMUSCULAR at 03:41

## 2020-04-08 RX ADMIN — ASPIRIN SCH MG: 81 TABLET, COATED ORAL at 08:16

## 2020-04-08 NOTE — DS
DATE OF DISCHARGE:  2020



HOSPITAL SUMMARY:  The patient has a history of seizure disorder and mental

retardation, admitted when her significant other  and she was no longer able

to stay in the home setting.  CBC, chest x-ray and COVID tests were negative, as

were her lab.  While in the hospital, she spiked a temperature to 100-102 and

urine and blood cultures negative.  No source of infection is seen, but is

suspected that she could have the COVID virus despite a negative test.  Nursing

Center is willing to take her, understanding these restrictions and possible

COVID diagnosis and she was transferred today without change.



FINAL DIAGNOSES:

1.  Fever, unknown origin.  Suspect COVID infection.

2.  Chronic seizure disorder, mental retardation.



OPERATIONS, PROCEDURES, COMPLICATIONS:  None.



CONSULTATIONS:  Dr. Solares and Dr. Carney.



DISPOSITION:  All meds remain the same.  No specific treatment for the COVID

virus.  She is a DNR patient, comfort care measures.

 



______________________________

ROHAN ESTES MD



DR:  APRYL/jazmin  JOB#:  [id number] / [id number]

DD:  2020 12:20  DT:  2020 12:32

## 2020-04-08 NOTE — DS
DATE OF DISCHARGE:  04/08/2020



ADMISSION DIAGNOSES:  Pneumonia and acute kidney injury.



DISCHARGE DIAGNOSIS:  Resolving coronavirus disease-19.



HOSPITAL COURSE:  The patient is a pleasant 75-year-old female who presented

with shortness of breath, pneumonia and was noted to be dehydrated, had acute

kidney injury.  She was admitted.  We gave her IV antibiotics, azithromycin and

Plaquenil.  Her COVID test was positive.  This morning, I saw her, examined her,

she was doing great, requesting to be discharged.



PHYSICAL EXAMINATION:

HEART:  Tones were normal.

LUNGS:  Clear.

ABDOMEN:  Soft.

EXTREMITIES:  No edema.



I left prescriptions for azithromycin and Plaquenil for 3 more days.



DISPOSITION:  Home.



ACTIVITY:  As tolerated.



DIET:  Low sodium.



MEDICATIONS:  Please see the MRAD.



TOTAL TIME:  32 minutes.  We did tell the patient to stay home and quarantine

for the next 12 days.

 



______________________________

NICO CAMPBELL DO



DR:  SOCORRO/jazmin  JOB#:  [id number] / [id number]

DD:  04/08/2020 12:13  DT:  04/08/2020 12:36

## 2020-05-27 ENCOUNTER — HOSPITAL ENCOUNTER (EMERGENCY)
Dept: HOSPITAL 61 - ER | Age: 75
Discharge: HOME | End: 2020-05-27
Payer: MEDICARE

## 2020-05-27 VITALS — SYSTOLIC BLOOD PRESSURE: 163 MMHG | DIASTOLIC BLOOD PRESSURE: 71 MMHG

## 2020-05-27 VITALS — WEIGHT: 176.37 LBS | BODY MASS INDEX: 31.25 KG/M2 | HEIGHT: 63 IN

## 2020-05-27 DIAGNOSIS — M62.838: ICD-10-CM

## 2020-05-27 DIAGNOSIS — M54.2: Primary | ICD-10-CM

## 2020-05-27 DIAGNOSIS — I10: ICD-10-CM

## 2020-05-27 DIAGNOSIS — Z88.5: ICD-10-CM

## 2020-05-27 DIAGNOSIS — M54.6: ICD-10-CM

## 2020-05-27 DIAGNOSIS — Z88.8: ICD-10-CM

## 2020-05-27 PROCEDURE — 96372 THER/PROPH/DIAG INJ SC/IM: CPT

## 2020-05-27 PROCEDURE — 99284 EMERGENCY DEPT VISIT MOD MDM: CPT

## 2020-05-27 NOTE — PHYS DOC
Past Medical History


Past Medical History:  Hypertension, Other


Additional Past Medical Histor:  HYPOKALEMIA


Past Surgical History:  Hysterectomy


Additional Past Surgical Histo:  exploratory abdominal surgery


Smoking Status:  Never Smoker


Alcohol Use:  None


Drug Use:  None





General Adult


EDM:


Chief Complaint:  Neck Pain





HPI:


HPI:





Patient is a 75  year old female who presents with complaint of approximately 2-

week history of left-sided neck pain and upper back pain.  Patient states that 

pain is worsened with movement of the neck and palpation.  She denies any fever.

 Patient is concerned because she was diagnosed last month with COVID and wanted

to make sure that this was not something associated with that infection.  []





Review of Systems:


Review of Systems:


Constitutional:  Denies fever or chills. []


Respiratory:  Denies cough or shortness of breath. [] 


Cardiovascular:  Denies chest pain or edema. [] 


Musculoskeletal: Complains of left-sided neck and upper back pain. [] 


Integument:  Denies rash. [] 


Neurologic:  Denies headache, focal weakness or sensory changes. []





Heart Score:


Risk Factors:


Risk Factors:  DM, Current or recent (<one month) smoker, HTN, HLP, family 

history of CAD, obesity.


Risk Scores:


Score 0 - 3:  2.5% MACE over next 6 weeks - Discharge Home


Score 4 - 6:  20.3% MACE over next 6 weeks - Admit for Clinical Observation


Score 7 - 10:  72.7% MACE over next 6 weeks - Early Invasive Strategies





Current Medications:





Current Medications








 Medications


  (Trade)  Dose


 Ordered  Sig/Ness  Start Time


 Stop Time Status Last Admin


Dose Admin


 


 Dexamethasone


 Sodium Phosphate


  (Decadron)  10 mg  1X  ONCE  5/27/20 01:30


 5/27/20 01:31   





 


 Orphenadrine


 Citrate


  (Norflex)  60 mg  1X  ONCE  5/27/20 01:30


 5/27/20 01:31   














Allergies:


Allergies:





Allergies








Coded Allergies Type Severity Reaction Last Updated Verified


 


  ACE Inhibitors Allergy Severe SWELLING ON FACE /THROAT 7/7/15 Yes


 


  codeine Adverse Reaction Intermediate nausea/vomitting 7/7/15 Yes











Physical Exam:


PE:





Constitutional: Well developed, well nourished, no acute distress, non-toxic 

appearance. []


HENT: Normocephalic, atraumatic, bilateral external ears normal, oropharynx mo

ist, no oral exudates, nose normal. []


Neck: Normal range of motion, with left-sided suboccipital and cervical strap 

muscle tenderness and palpable spasm. [] 


Cardiovascular: Regular rate and rhythm []


Lungs & Thorax:  Bilateral breath sounds clear to auscultation []


Skin: Warm, dry, no erythema, no rash. [] 


Back: There is tenderness to palpation with palpable spasm in the left sided 

trapezius musculature. []





EKG:


EKG:


[]





Radiology/Procedures:


Radiology/Procedures:


[]





Course & Med Decision Making:


Course & Med Decision Making


Pertinent Labs and Imaging studies reviewed. (See chart for details)





[]





Dragon Disclaimer:


Dragon Disclaimer:


This electronic medical record was generated, in whole or in part, using a voice

 recognition dictation system.





Departure


Departure


Impression:  


   Primary Impression:  


   Neck pain


   Additional Impression:  


   Cervical paraspinal muscle spasm


Disposition:  01 HOME, SELF-CARE


Condition:  STABLE


Referrals:  


KHADRA GARCIA MD (PCP)


Patient Instructions:  Musculoskeletal Pain


Scripts


Tramadol Hcl (TRAMADOL HCL) 50 Mg Tablet


50 MG PO Q6HRS PRN for PAIN, #12 TAB


   Prov: ANNAMARIA LANG Jr. DO         5/27/20 


Orphenadrine Citrate (ORPHENADRINE CITRATE) 100 Mg Tablet.er


1 TAB PO BID PRN for MUSCLE SPASMS, #14 TAB


   Prov: ANNAMARIA LANG Jr. DO         5/27/20 


Diclofenac Sodium (DICLOFENAC SODIUM) 50 Mg Tablet.dr


1 TAB PO BID PRN for PAIN, #20 TAB


   Prov: ANNAMARIA LANG Jr. DO         5/27/20











ANNAMARIA LANG Jr. DO          May 27, 2020 01:04

## 2020-06-20 ENCOUNTER — HOSPITAL ENCOUNTER (INPATIENT)
Dept: HOSPITAL 61 - ER | Age: 75
LOS: 2 days | Discharge: HOME | DRG: 204 | End: 2020-06-22
Attending: FAMILY MEDICINE | Admitting: FAMILY MEDICINE
Payer: MEDICARE

## 2020-06-20 VITALS — HEIGHT: 62 IN | WEIGHT: 181 LBS | BODY MASS INDEX: 33.31 KG/M2

## 2020-06-20 DIAGNOSIS — K76.89: ICD-10-CM

## 2020-06-20 DIAGNOSIS — Z82.49: ICD-10-CM

## 2020-06-20 DIAGNOSIS — Z88.8: ICD-10-CM

## 2020-06-20 DIAGNOSIS — J98.11: ICD-10-CM

## 2020-06-20 DIAGNOSIS — Z90.710: ICD-10-CM

## 2020-06-20 DIAGNOSIS — K44.9: ICD-10-CM

## 2020-06-20 DIAGNOSIS — E78.5: ICD-10-CM

## 2020-06-20 DIAGNOSIS — M85.80: ICD-10-CM

## 2020-06-20 DIAGNOSIS — I16.0: ICD-10-CM

## 2020-06-20 DIAGNOSIS — E87.6: ICD-10-CM

## 2020-06-20 DIAGNOSIS — K21.9: ICD-10-CM

## 2020-06-20 DIAGNOSIS — R07.81: Primary | ICD-10-CM

## 2020-06-20 DIAGNOSIS — Z79.899: ICD-10-CM

## 2020-06-20 DIAGNOSIS — I10: ICD-10-CM

## 2020-06-20 DIAGNOSIS — E66.01: ICD-10-CM

## 2020-06-20 DIAGNOSIS — M19.90: ICD-10-CM

## 2020-06-20 PROCEDURE — 81001 URINALYSIS AUTO W/SCOPE: CPT

## 2020-06-20 PROCEDURE — 84145 PROCALCITONIN (PCT): CPT

## 2020-06-20 PROCEDURE — 96360 HYDRATION IV INFUSION INIT: CPT

## 2020-06-20 PROCEDURE — 84484 ASSAY OF TROPONIN QUANT: CPT

## 2020-06-20 PROCEDURE — 85379 FIBRIN DEGRADATION QUANT: CPT

## 2020-06-20 PROCEDURE — 85730 THROMBOPLASTIN TIME PARTIAL: CPT

## 2020-06-20 PROCEDURE — G0378 HOSPITAL OBSERVATION PER HR: HCPCS

## 2020-06-20 PROCEDURE — 83735 ASSAY OF MAGNESIUM: CPT

## 2020-06-20 PROCEDURE — 85610 PROTHROMBIN TIME: CPT

## 2020-06-20 PROCEDURE — A9500 TC99M SESTAMIBI: HCPCS

## 2020-06-20 PROCEDURE — 80048 BASIC METABOLIC PNL TOTAL CA: CPT

## 2020-06-20 PROCEDURE — 71275 CT ANGIOGRAPHY CHEST: CPT

## 2020-06-20 PROCEDURE — 36415 COLL VENOUS BLD VENIPUNCTURE: CPT

## 2020-06-20 PROCEDURE — 80053 COMPREHEN METABOLIC PANEL: CPT

## 2020-06-20 PROCEDURE — 93005 ELECTROCARDIOGRAM TRACING: CPT

## 2020-06-20 PROCEDURE — 93017 CV STRESS TEST TRACING ONLY: CPT

## 2020-06-20 PROCEDURE — 83880 ASSAY OF NATRIURETIC PEPTIDE: CPT

## 2020-06-20 PROCEDURE — 78452 HT MUSCLE IMAGE SPECT MULT: CPT

## 2020-06-20 PROCEDURE — 93970 EXTREMITY STUDY: CPT

## 2020-06-20 PROCEDURE — 85025 COMPLETE CBC W/AUTO DIFF WBC: CPT

## 2020-06-20 PROCEDURE — G0379 DIRECT REFER HOSPITAL OBSERV: HCPCS

## 2020-06-20 PROCEDURE — 82553 CREATINE MB FRACTION: CPT

## 2020-06-20 PROCEDURE — 80061 LIPID PANEL: CPT

## 2020-06-20 PROCEDURE — 83690 ASSAY OF LIPASE: CPT

## 2020-06-21 VITALS — SYSTOLIC BLOOD PRESSURE: 106 MMHG | DIASTOLIC BLOOD PRESSURE: 53 MMHG

## 2020-06-21 VITALS — SYSTOLIC BLOOD PRESSURE: 118 MMHG | DIASTOLIC BLOOD PRESSURE: 57 MMHG

## 2020-06-21 VITALS — SYSTOLIC BLOOD PRESSURE: 115 MMHG | DIASTOLIC BLOOD PRESSURE: 63 MMHG

## 2020-06-21 VITALS — DIASTOLIC BLOOD PRESSURE: 79 MMHG | SYSTOLIC BLOOD PRESSURE: 191 MMHG

## 2020-06-21 VITALS — SYSTOLIC BLOOD PRESSURE: 196 MMHG | DIASTOLIC BLOOD PRESSURE: 86 MMHG

## 2020-06-21 VITALS — SYSTOLIC BLOOD PRESSURE: 189 MMHG | DIASTOLIC BLOOD PRESSURE: 77 MMHG

## 2020-06-21 LAB
ALBUMIN SERPL-MCNC: 3.4 G/DL (ref 3.4–5)
ALBUMIN/GLOB SERPL: 0.9 {RATIO} (ref 1–1.7)
ALP SERPL-CCNC: 66 U/L (ref 46–116)
ALT SERPL-CCNC: 20 U/L (ref 14–59)
ANION GAP SERPL CALC-SCNC: 8 MMOL/L (ref 6–14)
APTT BLD: 35 SEC (ref 24–38)
APTT PPP: YELLOW S
AST SERPL-CCNC: 18 U/L (ref 15–37)
BACTERIA #/AREA URNS HPF: 0 /HPF
BASOPHILS # BLD AUTO: 0.1 X10^3/UL (ref 0–0.2)
BASOPHILS NFR BLD: 2 % (ref 0–3)
BILIRUB SERPL-MCNC: 0.3 MG/DL (ref 0.2–1)
BILIRUB UR QL STRIP: NEGATIVE
BUN SERPL-MCNC: 13 MG/DL (ref 7–20)
BUN/CREAT SERPL: 12 (ref 6–20)
CALCIUM SERPL-MCNC: 9.3 MG/DL (ref 8.5–10.1)
CHLORIDE SERPL-SCNC: 100 MMOL/L (ref 98–107)
CK SERPL-CCNC: 93 U/L (ref 26–192)
CO2 SERPL-SCNC: 28 MMOL/L (ref 21–32)
CREAT SERPL-MCNC: 1.1 MG/DL (ref 0.6–1)
D DIMER PPP FEU-MCNC: 0.79 UG/MLFEU (ref 0–0.5)
EOSINOPHIL NFR BLD: 0.1 X10^3/UL (ref 0–0.7)
EOSINOPHIL NFR BLD: 2 % (ref 0–3)
ERYTHROCYTE [DISTWIDTH] IN BLOOD BY AUTOMATED COUNT: 16.4 % (ref 11.5–14.5)
FIBRINOGEN PPP-MCNC: CLEAR MG/DL
GFR SERPLBLD BASED ON 1.73 SQ M-ARVRAT: 58.6 ML/MIN
GLOBULIN SER-MCNC: 3.9 G/DL (ref 2.2–3.8)
GLUCOSE SERPL-MCNC: 119 MG/DL (ref 70–99)
HCT VFR BLD CALC: 34.1 % (ref 36–47)
HGB BLD-MCNC: 11.5 G/DL (ref 12–15.5)
LIPASE: 162 U/L (ref 73–393)
LYMPHOCYTES # BLD: 2.4 X10^3/UL (ref 1–4.8)
LYMPHOCYTES NFR BLD AUTO: 43 % (ref 24–48)
MAGNESIUM SERPL-MCNC: 1.9 MG/DL (ref 1.8–2.4)
MCH RBC QN AUTO: 28 PG (ref 25–35)
MCHC RBC AUTO-ENTMCNC: 34 G/DL (ref 31–37)
MCV RBC AUTO: 82 FL (ref 79–100)
MONO #: 0.7 X10^3/UL (ref 0–1.1)
MONOCYTES NFR BLD: 12 % (ref 0–9)
NEUT #: 2.3 X10^3/UL (ref 1.8–7.7)
NEUTROPHILS NFR BLD AUTO: 41 % (ref 31–73)
NITRITE UR QL STRIP: NEGATIVE
PH UR STRIP: 7 [PH]
PLATELET # BLD AUTO: 332 X10^3/UL (ref 140–400)
POTASSIUM SERPL-SCNC: 3.4 MMOL/L (ref 3.5–5.1)
PROT SERPL-MCNC: 7.3 G/DL (ref 6.4–8.2)
PROT UR STRIP-MCNC: NEGATIVE MG/DL
PROTHROMBIN TIME: 13.3 SEC (ref 11.7–14)
RBC # BLD AUTO: 4.17 X10^6/UL (ref 3.5–5.4)
RBC #/AREA URNS HPF: (no result) /HPF (ref 0–2)
SODIUM SERPL-SCNC: 136 MMOL/L (ref 136–145)
SQUAMOUS #/AREA URNS LPF: (no result) /LPF
UROBILINOGEN UR-MCNC: 0.2 MG/DL
WBC # BLD AUTO: 5.6 X10^3/UL (ref 4–11)
WBC #/AREA URNS HPF: (no result) /HPF (ref 0–4)

## 2020-06-21 RX ADMIN — FENTANYL CITRATE PRN MCG: 50 INJECTION INTRAMUSCULAR; INTRAVENOUS at 04:28

## 2020-06-21 RX ADMIN — LABETALOL HCL SCH MG: 200 TABLET, FILM COATED ORAL at 22:40

## 2020-06-21 RX ADMIN — FENTANYL CITRATE PRN MCG: 50 INJECTION INTRAMUSCULAR; INTRAVENOUS at 17:36

## 2020-06-21 RX ADMIN — ASPIRIN SCH MG: 81 TABLET, COATED ORAL at 12:15

## 2020-06-21 RX ADMIN — FENTANYL CITRATE PRN MCG: 50 INJECTION INTRAMUSCULAR; INTRAVENOUS at 12:18

## 2020-06-21 RX ADMIN — LABETALOL HCL SCH MG: 200 TABLET, FILM COATED ORAL at 14:34

## 2020-06-21 RX ADMIN — PANTOPRAZOLE SODIUM SCH MG: 40 TABLET, DELAYED RELEASE ORAL at 17:38

## 2020-06-21 RX ADMIN — VITAMIN D, TAB 1000IU (100/BT) SCH UNIT: 25 TAB at 12:17

## 2020-06-21 RX ADMIN — Medication SCH MG: at 12:17

## 2020-06-21 RX ADMIN — POTASSIUM CHLORIDE SCH MEQ: 1500 TABLET, EXTENDED RELEASE ORAL at 12:16

## 2020-06-21 NOTE — PDOC1
History and Physical


Date of Admission


Date of Admission


DATE: 6/21/20 


TIME: 11:09





Identification/Chief Complaint


Chief Complaint


Patient is a 75  year old FEMALE  who presents with CHEST DISCOMFORT  

//presented with retrosternal chest pain that she described as pressure-like 

sensation, 8/10 severity, waxing and waning since 6/20.  She denied any 

exertional component.  She also denied any orthopnea/PND, palpitations or 

syncope.  She was apparently told that she had coronary vasospasm in 2002 when 

cardiac catheterization did not show any significant coronary artery stenosis.  





recently evaluated at [facility] for acute viral syndrome in April 2020





Past Medical History


Past Medical History:  Hypertension, Other


Additional Past Medical Histor:  HYPOKALEMIA


Past Surgical History:  Hysterectomy


Additional Past Surgical Histo:  exploratory abdominal surgery


Smoking Status:  Never Smoker


Alcohol Use:  None


Drug Use:  None








fhx obesity


Cardiovascular:  HTN


Pulmonary:  No pertinent hx


CENTRAL NERVOUS SYSTEM:  Other


GI:  GERD


Heme/Onc:  No pertinent hx


Psych:  No pertinent hx


Musculoskeletal:  Osteoarthritis


Rheumatologic:  No pertinent hx


Infectious disease:  No pertinent hx


Renal/:  No pertinent hx


Endocrine:  Osteopenia





Past Surgical History


Past Surgical History:  Hysterectomy, Other





Family History


Family History:  Coronary Artery Disease, Hypertension





Social History


Smoke:  No


ALCOHOL:  none


Drugs:  None





Current Problem List


Problem List


Problems


Medical Problems:


(1) Chest pain, rule out acute myocardial infarction


Status: Acute  





(2) Elevated d-dimer


Status: Acute  











Current Medications


Current Medications





Current Medications


Aspirin (Guru Aspirin) 325 mg 1X  ONCE PO ;  Start 6/21/20 at 00:30;  Stop 

6/21/20 at 00:23;  Status DC


Sodium Chloride 1,000 ml @  1,000 mls/hr 1X  ONCE IV  Last administered on 

6/21/20at 00:44;  Start 6/21/20 at 00:30;  Stop 6/21/20 at 01:29;  Status DC


Aspirin (Aspirin Chewable) 243 mg 1X  ONCE PO  Last administered on 6/21/20at 

00:44;  Start 6/21/20 at 01:00;  Stop 6/21/20 at 01:01;  Status DC


Iohexol (Omnipaque 350 Mg/ml) 90 ml 1X  ONCE IV  Last administered on 6/21/20at 

01:35;  Start 6/21/20 at 02:00;  Stop 6/21/20 at 02:01;  Status DC


Info (CONTRAST GIVEN -- Rx MONITORING) 1 each PRN DAILY  PRN MC SEE COMMENTS;  

Start 6/21/20 at 01:30;  Stop 6/23/20 at 01:29


Ondansetron HCl (Zofran) 4 mg PRN Q8HRS  PRN IV NAUSEA/VOMITING 1ST CHOICE;  

Start 6/21/20 at 01:45;  Stop 6/22/20 at 01:44


Fentanyl Citrate (Fentanyl 2ml Vial) 25 mcg PRN Q2HRS  PRN IV SEVERE PAIN 7-10 

Last administered on 6/21/20at 04:28;  Start 6/21/20 at 01:45


Potassium Chloride (Klor-Con) 20 meq 1X  ONCE PO ;  Start 6/21/20 at 11:00;  

Stop 6/21/20 at 11:05;  Status DC





Active Scripts


Active


Reported


Amoxicillin 500 Mg Capsule 1 Cap PO Q8HRS 10 Days


Docusate Sodium 100 Mg Capsule 1 Cap PO PRN DAILY PRN 30 Days


Feosol (Ferrous Sulfate) 325 Mg Tablet 1 Tab PO DAILY 30 Days


Hydrochlorothiazide Tablet (Hydrochlorothiazide) 12.5 Mg Tablet 12.5 Mg PO DAILY


Potassium Chloride ** (Potassium Chloride) 20 Meq Tablet.er 20 Meq PO DAILY08


Vitamin D2 (Ergocalciferol (Vitamin D2)) 1,250 Mcg Capsule 1,250 Mcg PO EVERY 

OTHER WEEK


Aspir 81 (Aspirin) 81 Mg Tablet.dr 1 Tab PO DAILY


Klor-Con M20 (Potassium Chloride) 20 Meq Tab.er.prt 20 Meq PO PRN DAILY PRN


Amlodipine Besylate 10 Mg Tablet 10 Mg PO DAILY





Allergies


Allergies:  


Coded Allergies:  


     ACE Inhibitors (Verified  Allergy, Severe, SWELLING ON FACE /THROAT, 

7/7/15)


     codeine (Verified  Adverse Reaction, Intermediate, nausea/vomitting, 

7/7/15)





ROS


Review of System





Review of Systems:


Review of Systems:


Constitutional:  Denies fever or chills. []


Eyes:  Denies change in visual acuity. []


HENT:  Denies nasal congestion or sore throat. [] 


Respiratory:  Denies cough or shortness of breath. [] 


Cardiovascular:  mild  chest pain no  edema. [] 


GI:  Denies abdominal pain, nausea, vomiting, bloody stools or diarrhea. [] 


:  Denies dysuria. [] 


Musculoskeletal:  Denies back pain or joint pain. [] 


Integument:  Denies rash. [] 


Neurologic:  Denies headache, focal weakness or sensory changes. [] 


Endocrine:  Denies polyuria or polydipsia. [] 


Lymphatic:  Denies swollen glands. [] 


Psychiatric:  Denies depression or anxiety. []





14 pt ros othersise neg


Hematological and Lymphatic:  No: Bleeding Problems, Blood Clots, Blood T

ransfusions, Brusing, Night Sweats, Pallor, Swollen Lymph Nodes, Other


Respiratory:  YES: Shortness of breath


Cardiovascular:  yes Chest Pain


Musculoskeletal:  No Gait Disturbance, No Joint Pain, No Joint Stiffness, No 

Joint Swelling, No Muscle Pain, No Muscular Weakness, No Pain In:, No Swelling 

In:, No Other


Neurological:  No Behavorial Changes, No Bowel/Bladder ControlChng, No 

Confusion, No Dizziness, No Gait Disturbance, No Headaches, No Impaired 

Coord/balance, No Memory Loss, No Numbness/Tingling, No Seizures, No Speech 

Problems, No Tremors, No Visual Changes, No Weakness, No Other





Physical Exam


Physical Exam





Constitutional: Well developed, well nourished, no acute distress, non-toxic 

appearance. []


HENT: Normocephalic, atraumatic, bilateral external ears normal, oropharynx 

moist, no oral exudates, nose normal. []


Eyes: PERRLA, EOMI, conjunctiva normal, no discharge. [] 


Neck: Normal range of motion, no tenderness, supple, no stridor. [] 


Cardiovascular:Heart rate regular rhythm, no murmur []


Lungs & Thorax:  Bilateral breath sounds clear to auscultation []


Abdomen: Bowel sounds normal, soft, no tenderness, no masses, no pulsatile 

masses. [] 


Skin: Warm, dry, no erythema, no rash. [] 


Back: No tenderness, no CVA tenderness. [] 


Extremities: No tenderness, no cyanosis, no clubbing, ROM intact, no edema. [] 


Neurologic: Alert and oriented X 3, normal motor function, normal sensory 

function, no focal deficits noted. []


Psychologic: Affect normal, judgment normal, mood normal. []


General:  No acute distress


HEENT:  EOMI, Mucous membr. moist/pink


Lungs:  Clear to auscultation, Normal air movement


Heart:  S1S2, RRR, no thrills, no gallops


Breasts:  Not examined


Rectal Exam:  not examined


PELVIC:  Examination not indicated


Extremities:  No cyanosis


Neuro:  Normal speech, Cranial nerves 3-12 NL


Psych/Mental Status:  Mental status NL, Mood NL





Vitals


Vitals





Vital Signs








  Date Time  Temp Pulse Resp B/P (MAP) Pulse Ox O2 Delivery O2 Flow Rate FiO2


 


6/21/20 11:03 97.6 62 19 196/86 (122) 99 Room Air  





 97.6       











Labs


Labs





Laboratory Tests








Test


 6/20/20


23:53 6/21/20


00:40 6/21/20


05:00 6/21/20


07:45


 


White Blood Count


 5.6 x10^3/uL


(4.0-11.0) 


 


 





 


Red Blood Count


 4.17 x10^6/uL


(3.50-5.40) 


 


 





 


Hemoglobin


 11.5 g/dL


(12.0-15.5) 


 


 





 


Hematocrit


 34.1 %


(36.0-47.0) 


 


 





 


Mean Corpuscular Volume 82 fL ()    


 


Mean Corpuscular Hemoglobin 28 pg (25-35)    


 


Mean Corpuscular Hemoglobin


Concent 34 g/dL


(31-37) 


 


 





 


Red Cell Distribution Width


 16.4 %


(11.5-14.5) 


 


 





 


Platelet Count


 332 x10^3/uL


(140-400) 


 


 





 


Neutrophils (%) (Auto) 41 % (31-73)    


 


Lymphocytes (%) (Auto) 43 % (24-48)    


 


Monocytes (%) (Auto) 12 % (0-9)    


 


Eosinophils (%) (Auto) 2 % (0-3)    


 


Basophils (%) (Auto) 2 % (0-3)    


 


Neutrophils # (Auto)


 2.3 x10^3/uL


(1.8-7.7) 


 


 





 


Lymphocytes # (Auto)


 2.4 x10^3/uL


(1.0-4.8) 


 


 





 


Monocytes # (Auto)


 0.7 x10^3/uL


(0.0-1.1) 


 


 





 


Eosinophils # (Auto)


 0.1 x10^3/uL


(0.0-0.7) 


 


 





 


Basophils # (Auto)


 0.1 x10^3/uL


(0.0-0.2) 


 


 





 


Prothrombin Time


 13.3 SEC


(11.7-14.0) 


 


 





 


Prothromb Time International


Ratio 1.1 (0.8-1.1) 


 


 


 





 


Activated Partial


Thromboplast Time 35 SEC (24-38) 


 


 


 





 


D-Dimer (Ghislaine)


 0.79 ug/mlFEU


(0.00-0.50) 


 


 





 


Sodium Level


 136 mmol/L


(136-145) 


 


 





 


Potassium Level


 3.4 mmol/L


(3.5-5.1) 


 


 





 


Chloride Level


 100 mmol/L


() 


 


 





 


Carbon Dioxide Level


 28 mmol/L


(21-32) 


 


 





 


Anion Gap 8 (6-14)    


 


Blood Urea Nitrogen


 13 mg/dL


(7-20) 


 


 





 


Creatinine


 1.1 mg/dL


(0.6-1.0) 


 


 





 


Estimated GFR


(Cockcroft-Gault) 58.6 


 


 


 





 


BUN/Creatinine Ratio 12 (6-20)    


 


Glucose Level


 119 mg/dL


(70-99) 


 


 





 


Calcium Level


 9.3 mg/dL


(8.5-10.1) 


 


 





 


Magnesium Level


 1.9 mg/dL


(1.8-2.4) 


 


 





 


Total Bilirubin


 0.3 mg/dL


(0.2-1.0) 


 


 





 


Aspartate Amino Transf


(AST/SGOT) 18 U/L (15-37) 


 


 


 





 


Alanine Aminotransferase


(ALT/SGPT) 20 U/L (14-59) 


 


 


 





 


Alkaline Phosphatase


 66 U/L


() 


 


 





 


Creatine Kinase


 93 U/L


() 


 


 





 


Creatine Kinase MB (Mass)


 0.8 ng/mL


(0.0-3.6) 


 


 





 


Creatine Kinase MB Relative


Index 0.9 % (0-4) 


 


 


 





 


Troponin I Quantitative


 < 0.017 ng/mL


(0.000-0.055) 


 < 0.017 ng/mL


(0.000-0.055) < 0.017 ng/mL


(0.000-0.055)


 


NT-Pro-B-Type Natriuretic


Peptide 83 pg/mL


(0-449) 


 


 





 


Total Protein


 7.3 g/dL


(6.4-8.2) 


 


 





 


Albumin


 3.4 g/dL


(3.4-5.0) 


 


 





 


Albumin/Globulin Ratio 0.9 (1.0-1.7)    


 


Lipase


 162 U/L


() 


 


 





 


Urine Collection Type  Unknown   


 


Urine Color  Yellow   


 


Urine Clarity  Clear   


 


Urine pH  7.0 (<5.0-8.0)   


 


Urine Specific Gravity


 


 1.010


(1.000-1.030) 


 





 


Urine Protein


 


 Negative mg/dL


(NEG-TRACE) 


 





 


Urine Glucose (UA)


 


 Negative mg/dL


(NEG) 


 





 


Urine Ketones (Stick)


 


 Negative mg/dL


(NEG) 


 





 


Urine Blood  Negative (NEG)   


 


Urine Nitrite  Negative (NEG)   


 


Urine Bilirubin  Negative (NEG)   


 


Urine Urobilinogen Dipstick


 


 0.2 mg/dL (0.2


mg/dL) 


 





 


Urine Leukocyte Esterase  Negative (NEG)   


 


Urine RBC  Occ /HPF (0-2)   


 


Urine WBC  Occ /HPF (0-4)   


 


Urine Squamous Epithelial


Cells 


 Few /LPF 


 


 





 


Urine Bacteria  0 /HPF (0-FEW)   


 


Urine Mucus  Slight /LPF   








Laboratory Tests








Test


 6/20/20


23:53 6/21/20


00:40 6/21/20


05:00 6/21/20


07:45


 


White Blood Count


 5.6 x10^3/uL


(4.0-11.0) 


 


 





 


Red Blood Count


 4.17 x10^6/uL


(3.50-5.40) 


 


 





 


Hemoglobin


 11.5 g/dL


(12.0-15.5) 


 


 





 


Hematocrit


 34.1 %


(36.0-47.0) 


 


 





 


Mean Corpuscular Volume 82 fL ()    


 


Mean Corpuscular Hemoglobin 28 pg (25-35)    


 


Mean Corpuscular Hemoglobin


Concent 34 g/dL


(31-37) 


 


 





 


Red Cell Distribution Width


 16.4 %


(11.5-14.5) 


 


 





 


Platelet Count


 332 x10^3/uL


(140-400) 


 


 





 


Neutrophils (%) (Auto) 41 % (31-73)    


 


Lymphocytes (%) (Auto) 43 % (24-48)    


 


Monocytes (%) (Auto) 12 % (0-9)    


 


Eosinophils (%) (Auto) 2 % (0-3)    


 


Basophils (%) (Auto) 2 % (0-3)    


 


Neutrophils # (Auto)


 2.3 x10^3/uL


(1.8-7.7) 


 


 





 


Lymphocytes # (Auto)


 2.4 x10^3/uL


(1.0-4.8) 


 


 





 


Monocytes # (Auto)


 0.7 x10^3/uL


(0.0-1.1) 


 


 





 


Eosinophils # (Auto)


 0.1 x10^3/uL


(0.0-0.7) 


 


 





 


Basophils # (Auto)


 0.1 x10^3/uL


(0.0-0.2) 


 


 





 


Prothrombin Time


 13.3 SEC


(11.7-14.0) 


 


 





 


Prothromb Time International


Ratio 1.1 (0.8-1.1) 


 


 


 





 


Activated Partial


Thromboplast Time 35 SEC (24-38) 


 


 


 





 


D-Dimer (Ghislaine)


 0.79 ug/mlFEU


(0.00-0.50) 


 


 





 


Sodium Level


 136 mmol/L


(136-145) 


 


 





 


Potassium Level


 3.4 mmol/L


(3.5-5.1) 


 


 





 


Chloride Level


 100 mmol/L


() 


 


 





 


Carbon Dioxide Level


 28 mmol/L


(21-32) 


 


 





 


Anion Gap 8 (6-14)    


 


Blood Urea Nitrogen


 13 mg/dL


(7-20) 


 


 





 


Creatinine


 1.1 mg/dL


(0.6-1.0) 


 


 





 


Estimated GFR


(Cockcroft-Gault) 58.6 


 


 


 





 


BUN/Creatinine Ratio 12 (6-20)    


 


Glucose Level


 119 mg/dL


(70-99) 


 


 





 


Calcium Level


 9.3 mg/dL


(8.5-10.1) 


 


 





 


Magnesium Level


 1.9 mg/dL


(1.8-2.4) 


 


 





 


Total Bilirubin


 0.3 mg/dL


(0.2-1.0) 


 


 





 


Aspartate Amino Transf


(AST/SGOT) 18 U/L (15-37) 


 


 


 





 


Alanine Aminotransferase


(ALT/SGPT) 20 U/L (14-59) 


 


 


 





 


Alkaline Phosphatase


 66 U/L


() 


 


 





 


Creatine Kinase


 93 U/L


() 


 


 





 


Creatine Kinase MB (Mass)


 0.8 ng/mL


(0.0-3.6) 


 


 





 


Creatine Kinase MB Relative


Index 0.9 % (0-4) 


 


 


 





 


Troponin I Quantitative


 < 0.017 ng/mL


(0.000-0.055) 


 < 0.017 ng/mL


(0.000-0.055) < 0.017 ng/mL


(0.000-0.055)


 


NT-Pro-B-Type Natriuretic


Peptide 83 pg/mL


(0-449) 


 


 





 


Total Protein


 7.3 g/dL


(6.4-8.2) 


 


 





 


Albumin


 3.4 g/dL


(3.4-5.0) 


 


 





 


Albumin/Globulin Ratio 0.9 (1.0-1.7)    


 


Lipase


 162 U/L


() 


 


 





 


Urine Collection Type  Unknown   


 


Urine Color  Yellow   


 


Urine Clarity  Clear   


 


Urine pH  7.0 (<5.0-8.0)   


 


Urine Specific Gravity


 


 1.010


(1.000-1.030) 


 





 


Urine Protein


 


 Negative mg/dL


(NEG-TRACE) 


 





 


Urine Glucose (UA)


 


 Negative mg/dL


(NEG) 


 





 


Urine Ketones (Stick)


 


 Negative mg/dL


(NEG) 


 





 


Urine Blood  Negative (NEG)   


 


Urine Nitrite  Negative (NEG)   


 


Urine Bilirubin  Negative (NEG)   


 


Urine Urobilinogen Dipstick


 


 0.2 mg/dL (0.2


mg/dL) 


 





 


Urine Leukocyte Esterase  Negative (NEG)   


 


Urine RBC  Occ /HPF (0-2)   


 


Urine WBC  Occ /HPF (0-4)   


 


Urine Squamous Epithelial


Cells 


 Few /LPF 


 


 





 


Urine Bacteria  0 /HPF (0-FEW)   


 


Urine Mucus  Slight /LPF   











Images


Images





Examination: CT angiography chest


 


HISTORY: History of chest pain, elevated d-dimer


 


COMPARISON: None available 


 


Technique: Axial CT angiographic images of chest were performed with IV 


contrast. Coronal and sagittal 3-D MIP reformats are performed


 


Exposure: One or more of the following individualized dose reduction 


techniques were utilized for this examination:  1. Automated exposure 


control  2. Adjustment of the mA and/or kV according to patient size  3. 


Use of iterative reconstruction technique


 


FINDINGS:


The central airways are patent. The caliber of the aorta grossly appears 


unremarkable. There is no evidence of filling defect identified in the 


main pulmonary arterial trunk and right and left main pulmonary arteries 


and the visualized lobar, segmental branch of the pulmonary arteries. 


Faint airspace opacities identified in the right upper lobe, right middle 


lobe and left lingula likely atelectasis or infiltrates. Linear 


atelectasis or infiltrate left lung base. Multiple cystic structures 


identified in the liver most likely cyst similar to prior exam. Small 


hiatal hernia. Mild degenerative changes thoracic spine.


 


IMPRESSION:


 


 


1. No evidence of pulmonary embolism.


 


 


2. Faint airspace opacities identified in the right upper lobe, right 


middle lobe and left lingula likely atelectasis or infiltrates.


 


Electronically signed by: Иван Rendon MD (6/21/2020 1:48 AM) [id number]














DICTATED and SIGNED BY:     ИВАН RENDON MD


DATE:     06/21/20 0148


EXAM: Two-dimensional and M-mode echocardiogram with Doppler and color Doppler.





Other Information 


Quality : Fair   





INDICATION


Chest Pain 





2D DIMENSIONS 


RVDd   2.7 (2.9-3.5cm)   Left Atrium(2D)   2.8 (1.6-4.0cm)


IVSd   1.0 (0.7-1.1cm)   Aortic Root(2D)   3.1 (2.0-3.7cm)


LVDd   4.9 (3.9-5.9cm)   LVOT Diameter   2.0 (1.8-2.4cm)


PWd   1.1 (0.7-1.1cm)   LVDs      3.0 (2.5-4.0cm)


FS (%)    30.0 %      SV      78.1 ml


LVEF(%)   60.0 (>50%)         





Aortic Valve


AoV Peak Geovany.   99.6cm/s   AoV VTI      19.2cm


AO Peak GR.   4.0mmHg      LVOT Peak Geovany.   64.3cm/s


LVOT  VTI    19.70cm      AO Mean GR.   2mmHg


YUNI (VMAX)   2.13cm2      YUNI   (VTI)   3.38cm2





Mitral Valve


MV E Velocity   47.2cm/s   MV DECEL TIME   373ms


MV A Velocity   72.6cm/s   MV PHT      108ms


E/A  Ratio   0.7      MVA (PHT)   2.03cm2





TDI


E/Lateral E'   7.3   E/Medial E'   10.0





Tricuspid Valve


TR P. Velocity   243cm/s   RAP ESTIMATE   3mmHg


TR Peak Gr.   24mmHg   RVSP      27mmHg





Pulmonary Vein


S1 Velocity   64.1cm/s   D2 Velocity   36.0cm/s


PVa duration   162msec            





 LEFT VENTRICLE 


The left ventricle is normal size. There is normal left ventricular wall 

thickness. The left ventricular systolic function is normal. The Ejection 

Fraction is 55-60%. There is normal LV segmental wall motion. Transmitral 

Doppler flow pattern is Grade I-abnormal relaxation pattern.





 RIGHT VENTRICLE 


The right ventricle is normal size. The right ventricular systolic function is 

normal.





 ATRIA 


The left atrium size is normal. The right atrium size is normal. The interatrial

 septum is intact with no evidence for an atrial septal defect or patent foramen

 ovale as noted on 2-D or Doppler imaging.





 AORTIC VALVE 


The aortic valve is calcified but opens well. Doppler and Color Flow revealed 

trace to mild aortic regurgitation. There is no significant aortic valvular 

stenosis.





 MITRAL VALVE 


The mitral valve is normal in structure and function. There is no evidence of 

mitral valve prolapse. There is no mitral valve stenosis. Doppler and Color Flow

 revealed no mitral valve regurgitation noted.





 TRICUSPID VALVE 


The tricuspid valve is normal in structure and function. Doppler and Color Flow 

revealed mild tricuspid regurgitation. The PA pressure was estimated at 27 mmHg.

 There is no tricuspid valve stenosis.





 PULMONIC VALVE 


The pulmonary valve is normal in structure and function. Doppler and Color Flow 

revealed no pulmonic valvular regurgitation. There is no pulmonic valvular 

stenosis.





 GREAT VESSELS 


The aortic root is normal in size. The ascending aorta is normal in size. The 

IVC is normal in size and collapses >50% with inspiration.





 PERICARDIAL EFFUSION 


There is no evidence of significant pericardial effusion.





Critical Notification


Critical Value: No





<Conclusion>


The left ventricular systolic function is normal.


The Ejection Fraction is 55-60%.


There is normal LV segmental wall motion.


Transmitral Doppler flow pattern is Grade I-abnormal relaxation pattern.


Trace to mild aortic regurgitation.


Mild tricuspid regurgitation.


The PA pressure was estimated at 27 mmHg.


There is no evidence of significant pericardial effusion.














DICTATED and SIGNED BY:     LAYA DE LA PAZ MD


DATE:     02/24/17 1400





CC: JOSIANE MONTANEZ; KHADRA GARCIA MD; LAYA DE LA PAZ MD ~





VTE Prophylaxis Ordered


VTE Prophylaxis Devices:  No


VTE Pharmacological Prophylaxi:  Yes





Assessment/Plan


Assessment/Plan


 Impression:  


   Chest pain, rule out acute myocardial infarction


   MORBID OBESITY


   Elevated d-dimer


   No evidence of pulmonary embolism. cta chest 6/20


            Faint airspace opacities identified in the right upper lobe, right  

middle lobe and left lingula likely atelectasis 








[facility] BED





ADMITTED


serial troponin i


cardiology consult


incentive spirometry


PO PROTONIX


D/W RN 


DVT PROPHYLAXIS





Justicifation of Admission Dx:


Justifications for Admission:


Justification of Admission Dx:  Yes


Aspiration Pneumonia:  Chronic Lung Disease


Angina:  Cresendo Worsening of Sym


Hypertension:  Cresendo Worsening of Sym


Comments:


ADMITTED WITH ABGINA  R/O TYE GIRARD MD          Jun 21, 2020 11:09

## 2020-06-21 NOTE — PHYS DOC
Past Medical History


Past Medical History:  Hypertension, Other


Additional Past Medical Histor:  HYPOKALEMIA


Past Surgical History:  Hysterectomy


Additional Past Surgical Histo:  exploratory abdominal surgery


Smoking Status:  Never Smoker


Alcohol Use:  None


Drug Use:  None





General Adult


EDM:


Chief Complaint:  CHEST PAIN-CARDIAC NATURE





HPI:


HPI:





Patient is a 75 year old female presents with report of midsternal chest 

discomfort that started at 2000 this evening.  Patient reports it is a pressure.

 Denies trauma.  Denies leg swelling or calf tenderness.  Patient with cardiac 

risk factors of hypertension.  Patient does report history of positive COVID 

test on 4/4/2020.  Denies fever chills.  Denies cough.  Patient reports taking 

81 mg aspirin just prior to arrival.





Review of Systems:


Review of Systems:





Constitutional: Denies fever or chills 


Eyes: Denies redness or eye pain 


HENT: Denies nasal congestion or sore throat


Respiratory: Denies cough or shortness of breath 


Cardiovascular: Reports chest pain; denies palpitations


GI: Denies abdominal pain, nausea, or vomiting


: Denies dysuria or hematuria


Musculoskeletal: Denies back pain or joint pain


Integument: Denies rash or skin lesions 


Neurologic: Denies headache, focal weakness or sensory changes





Complete systems were reviewed and found to be within normal limits, except as 

documented in this note.





Heart Score:


HEART Score for Chest Pain:  








HEART Score for Chest Pain Response (Comments) Value


 


History Moderately Suspicious 1


 


ECG Normal 0


 


Age > 65 2


 


Risk Factors                            1 or 2 Risk Factors 1


 


Troponin < Normal Limit 0


 


Total  4








Risk Factors:


Risk Factors:  DM, Current or recent (<one month) smoker, HTN, HLP, family 

history of CAD, obesity.


Risk Scores:


Score 0 - 3:  2.5% MACE over next 6 weeks - Discharge Home


Score 4 - 6:  20.3% MACE over next 6 weeks - Admit for Clinical Observation


Score 7 - 10:  72.7% MACE over next 6 weeks - Early Invasive Strategies





Current Medications:





Current Medications








 Medications


  (Trade)  Dose


 Ordered  Sig/Ness  Start Time


 Stop Time Status Last Admin


Dose Admin


 


 Aspirin


  (Aspirin


 Chewable)  243 mg  1X  ONCE  6/21/20 01:00


 6/21/20 01:01 DC 6/21/20 00:44


243 MG


 


 Aspirin


  (Guru Aspirin)  325 mg  1X  ONCE  6/21/20 00:30


 6/21/20 00:23 DC  





 


 Info


  (CONTRAST GIVEN


 -- Rx MONITORING)  1 each  PRN DAILY  PRN  6/21/20 01:30


 6/23/20 01:29   





 


 Iohexol


  (Omnipaque 350


 Mg/ml)  90 ml  1X  ONCE  6/21/20 02:00


 6/21/20 02:01   





 


 Sodium Chloride  1,000 ml @ 


 1,000 mls/hr  1X  ONCE  6/21/20 00:30


 6/21/20 01:29 DC 6/21/20 00:44


1,000 MLS/HR











Allergies:


Allergies:





Allergies








Coded Allergies Type Severity Reaction Last Updated Verified


 


  ACE Inhibitors Allergy Severe SWELLING ON FACE /THROAT 7/7/15 Yes


 


  codeine Adverse Reaction Intermediate nausea/vomitting 7/7/15 Yes











Physical Exam:


PE:





Constitutional: Well developed, well nourished, no acute distress, non-toxic 

appearance


HENT: Normocephalic, atraumatic


Eyes: Conjunctiva normal, no discharge


Neck: Normal range of motion, no tenderness, supple


Cardiovascular: Heart rate normal, regular rhythm


Lungs & Thorax:  Bilateral breath sounds clear to auscultation, no wheezing


Abdomen: Soft, no tenderness


Skin: Warm, dry, no erythema, no rash


Extremities: No tenderness, ROM intact, no edema


Neurologic: Alert and oriented X 3, no focal deficits noted


Psychologic: Affect normal, judgment normal





Current Patient Data:


Labs:





                                Laboratory Tests








Test


 6/20/20


23:53


 


White Blood Count


 5.6 x10^3/uL


(4.0-11.0)


 


Red Blood Count


 4.17 x10^6/uL


(3.50-5.40)


 


Hemoglobin


 11.5 g/dL


(12.0-15.5)  L


 


Hematocrit


 34.1 %


(36.0-47.0)  L


 


Mean Corpuscular Volume


 82 fL ()





 


Mean Corpuscular Hemoglobin 28 pg (25-35)  


 


Mean Corpuscular Hemoglobin


Concent 34 g/dL


(31-37)


 


Red Cell Distribution Width


 16.4 %


(11.5-14.5)  H


 


Platelet Count


 332 x10^3/uL


(140-400)


 


Neutrophils (%) (Auto) 41 % (31-73)  


 


Lymphocytes (%) (Auto) 43 % (24-48)  


 


Monocytes (%) (Auto) 12 % (0-9)  H


 


Eosinophils (%) (Auto) 2 % (0-3)  


 


Basophils (%) (Auto) 2 % (0-3)  


 


Neutrophils # (Auto)


 2.3 x10^3/uL


(1.8-7.7)


 


Lymphocytes # (Auto)


 2.4 x10^3/uL


(1.0-4.8)


 


Monocytes # (Auto)


 0.7 x10^3/uL


(0.0-1.1)


 


Eosinophils # (Auto)


 0.1 x10^3/uL


(0.0-0.7)


 


Basophils # (Auto)


 0.1 x10^3/uL


(0.0-0.2)


 


Prothrombin Time


 13.3 SEC


(11.7-14.0)


 


Prothrombin Time INR 1.1 (0.8-1.1)  


 


Activated Partial


Thromboplast Time 35 SEC (24-38)





 


D-Dimer (Ghislaine)


 0.79 ug/mlFEU


(0.00-0.50)  H


 


Sodium Level


 136 mmol/L


(136-145)


 


Potassium Level


 3.4 mmol/L


(3.5-5.1)  L


 


Chloride Level


 100 mmol/L


()


 


Carbon Dioxide Level


 28 mmol/L


(21-32)


 


Anion Gap 8 (6-14)  


 


Blood Urea Nitrogen


 13 mg/dL


(7-20)


 


Creatinine


 1.1 mg/dL


(0.6-1.0)  H


 


Estimated GFR


(Cockcroft-Gault) 58.6  





 


BUN/Creatinine Ratio 12 (6-20)  


 


Glucose Level


 119 mg/dL


(70-99)  H


 


Calcium Level


 9.3 mg/dL


(8.5-10.1)


 


Magnesium Level


 1.9 mg/dL


(1.8-2.4)


 


Total Bilirubin


 0.3 mg/dL


(0.2-1.0)


 


Aspartate Amino Transferase


(AST) 18 U/L (15-37)





 


Alanine Aminotransferase (ALT)


 20 U/L (14-59)





 


Alkaline Phosphatase


 66 U/L


()


 


Creatine Kinase


 93 U/L


()


 


Creatine Kinase MB (Mass)


 0.8 ng/mL


(0.0-3.6)


 


Creatine Kinase MB Relative


Index 0.9 % (0-4)  





 


Troponin I Quantitative


 < 0.017 ng/mL


(0.000-0.055)


 


NT-Pro-B-Type Natriuretic


Peptide 83 pg/mL


(0-449)


 


Total Protein


 7.3 g/dL


(6.4-8.2)


 


Albumin


 3.4 g/dL


(3.4-5.0)


 


Albumin/Globulin Ratio


 0.9 (1.0-1.7)


L


 


Lipase


 162 U/L


()





                                Laboratory Tests


6/20/20 23:53








                                Laboratory Tests


6/20/20 23:53








Vital Signs:





                                   Vital Signs








  Date Time  Temp Pulse Resp B/P (MAP) Pulse Ox O2 Delivery O2 Flow Rate FiO2


 


6/20/20 23:39 97.8 65 14 217/92 (133) 100 Room Air  





 97.8       











EKG:


EKG:


@2348 NSR at 68bpm, NO ST elevation, QRS 80ms, QT/QTc 470/500ms, low voltage 

QRS, no specific t wave inversion V1-V3





Radiology/Procedures:


Radiology/Procedures:


PROCEDURE: CT ANGIOGRAPHY CHEST





Examination: CT angiography chest


 


HISTORY: History of chest pain, elevated d-dimer


 


COMPARISON: None available 


 


Technique: Axial CT angiographic images of chest were performed with IV 


contrast. Coronal and sagittal 3-D MIP reformats are performed


 


Exposure: One or more of the following individualized dose reduction 


techniques were utilized for this examination:  1. Automated exposure 


control  2. Adjustment of the mA and/or kV according to patient size  3. 


Use of iterative reconstruction technique


 


FINDINGS:


The central airways are patent. The caliber of the aorta grossly appears 


unremarkable. There is no evidence of filling defect identified in the 


main pulmonary arterial trunk and right and left main pulmonary arteries 


and the visualized lobar, segmental branch of the pulmonary arteries. 


Faint airspace opacities identified in the right upper lobe, right middle 


lobe and left lingula likely atelectasis or infiltrates. Linear 


atelectasis or infiltrate left lung base. Multiple cystic structures 


identified in the liver most likely cyst similar to prior exam. Small 


hiatal hernia. Mild degenerative changes thoracic spine.


 


IMPRESSION:


 


 


1. No evidence of pulmonary embolism.


 


 


2. Faint airspace opacities identified in the right upper lobe, right 


middle lobe and left lingula likely atelectasis or infiltrates.


 


Electronically signed by: Иван Rendon MD (6/21/2020 1:48 AM) [id number]





Course & Med Decision Making:


Course & Med Decision Making


Pertinent Labs and Imaging studies reviewed. (See chart for details)





Patient presents with midsternal chest pain that occurred at 8 PM this evening. 

Patient with some cardiac risk factors of hypertension.  Patient does report 

taking 81 mg aspirin prior to arrival.  EKG without acute process.  Additional 

243 mg of aspirin provided.  Labs obtained and posted to chart.  Initial 

troponin within normal limits.  D-dimer elevated.  CTA chest without signs of 

PE.





HEART score 4.





Patient requiring admission for further evaluation and treatment. Discussed with

Dr. Villarreal (hospitalist) who is in agreement with admission. Discussed 

findings and plan with patient, who acknowledges understanding and agreement.





Dragon Disclaimer:


Dragon Disclaimer:


This electronic medical record was generated, in whole or in part, using a voice

recognition dictation system.





Departure


Departure


Impression:  


   Primary Impression:  


   Chest pain, rule out acute myocardial infarction


   Additional Impression:  


   Elevated d-dimer


Disposition:  09 ADMITTED AS INPATIENT


Admitting Physician:  DAISY (Cherelle)


Condition:  STABLE


Referrals:  


KHADRA GARCIA MD (PCP)


Scripts


Amlodipine Besylate (AMLODIPINE BESYLATE) 10 Mg Tablet


10 MG PO DAILY for HTN for 90 Days, #90 TAB


   Prov: ZACKERY HERNANDEZ MD         6/22/20 


Pantoprazole Sodium (PANTOPRAZOLE SODIUM  **) 40 Mg Tablet.


40 MG PO DAILYAC for GERD for 30 Days, #30 TAB.SR


   Prov: ZACKERY HERNANDEZ MD         6/22/20





Justicifation of Admission Dx:


Justifications for Admission:


Justification of Admission Dx:  Yes


Comments:


Chest pain











DENICE PATEL DO             Jun 21, 2020 01:33

## 2020-06-21 NOTE — RAD
Examination: CT angiography chest

 

HISTORY: History of chest pain, elevated d-dimer

 

COMPARISON: None available 

 

Technique: Axial CT angiographic images of chest were performed with IV 

contrast. Coronal and sagittal 3-D MIP reformats are performed

 

Exposure: One or more of the following individualized dose reduction 

techniques were utilized for this examination:  1. Automated exposure 

control  2. Adjustment of the mA and/or kV according to patient size  3. 

Use of iterative reconstruction technique

 

FINDINGS:

The central airways are patent. The caliber of the aorta grossly appears 

unremarkable. There is no evidence of filling defect identified in the 

main pulmonary arterial trunk and right and left main pulmonary arteries 

and the visualized lobar, segmental branch of the pulmonary arteries. 

Faint airspace opacities identified in the right upper lobe, right middle 

lobe and left lingula likely atelectasis or infiltrates. Linear 

atelectasis or infiltrate left lung base. Multiple cystic structures 

identified in the liver most likely cyst similar to prior exam. Small 

hiatal hernia. Mild degenerative changes thoracic spine.

 

IMPRESSION:

 

 

1. No evidence of pulmonary embolism.

 

 

2. Faint airspace opacities identified in the right upper lobe, right 

middle lobe and left lingula likely atelectasis or infiltrates.

 

Electronically signed by: Иван Rendon MD (6/21/2020 1:48 AM) [id number]

## 2020-06-21 NOTE — EKG
[facility]

              [address] [place] [address]

Test Date:    2020               Test Time:    23:46:23

Pat Name:     ANDREE ESTES             Department:   

Patient ID:   [id number]           Room:         [facility]

Gender:       F                        Technician:   

:          [id number]               Requested By: DENICE PATEL

Order Number: [id number]           Reading MD:   Lion Argueta

                                 Measurements

Intervals                              Axis          

Rate:         68                       P:            31

IL:           170                      QRS:          -20

QRSD:         80                       T:            1

QT:           470                                    

QTc:          500                                    

                           Interpretive Statements

SINUS RHYTHM

LEFTWARD AXIS

T ABNORMALITY IN ANTEROSEPTAL LEADS

PROLONGED QT

ABNORMAL ECG

Electronically Signed On 2020 10:19:24 CDT by Lion Argueta Last K+ lab  10/9/18  Of 4.2,  Last appt    Future appt 4/2019  Sudhakar Minaya for refill?

## 2020-06-21 NOTE — PDOC2
CONSULT


Date of Consult


Date of Consult


DATE: 6/21/20 


TIME: 13:16





Reason for Consult


Reason for Consult:


Chest pain





Referring Physician


Referring Physician:


Dr. Villarreal





Identification/Chief Complaint


Chief Complaint


Chest pain





Source


Source:  Chart review, Patient





History of Present Illness


Reason for Visit:


75-year-old female presented with retrosternal chest pain that she described as 

pressure-like sensation, 8/10 severity, waxing and waning since yesterday.  She 

denied any exertional component.  She also denied any orthopnea/PND, 

palpitations or syncope.  She was apparently told that she had coronary 

vasospasm in 2002 when cardiac catheterization did not show any significant 

coronary artery stenosis.  Of note, she was recently evaluated at [facility] for acute 

viral syndrome in April 2020 when COVID test was negative.





Past Medical History


Cardiovascular:  HTN


Pulmonary:  No pertinent hx


CENTRAL NERVOUS SYSTEM:  Other


GI:  GERD


Heme/Onc:  No pertinent hx


Psych:  No pertinent hx


Musculoskeletal:  Osteoarthritis


Rheumatologic:  No pertinent hx


Infectious disease:  No pertinent hx


Renal/:  No pertinent hx


Endocrine:  Osteopenia





Past Surgical History


Past Surgical History:  Hysterectomy, Other





Family History


Family History:  Coronary Artery Disease





Social History


ALCOHOL:  none


Drugs:  None


Lives:  with Family





Current Problem List


Problem List


Problems


Medical Problems:


(1) Chest pain, rule out acute myocardial infarction


Status: Acute  





(2) Elevated d-dimer


Status: Acute  











Current Medications


Current Medications





Current Medications


Aspirin (Guru Aspirin) 325 mg 1X  ONCE PO ;  Start 6/21/20 at 00:30;  Stop 

6/21/20 at 00:23;  Status DC


Sodium Chloride 1,000 ml @  1,000 mls/hr 1X  ONCE IV  Last administered on 

6/21/20at 00:44;  Start 6/21/20 at 00:30;  Stop 6/21/20 at 01:29;  Status DC


Aspirin (Aspirin Chewable) 243 mg 1X  ONCE PO  Last administered on 6/21/20at 

00:44;  Start 6/21/20 at 01:00;  Stop 6/21/20 at 01:01;  Status DC


Iohexol (Omnipaque 350 Mg/ml) 90 ml 1X  ONCE IV  Last administered on 6/21/20at 

01:35;  Start 6/21/20 at 02:00;  Stop 6/21/20 at 02:01;  Status DC


Info (CONTRAST GIVEN -- Rx MONITORING) 1 each PRN DAILY  PRN MC SEE COMMENTS;  

Start 6/21/20 at 01:30;  Stop 6/23/20 at 01:29


Ondansetron HCl (Zofran) 4 mg PRN Q8HRS  PRN IV NAUSEA/VOMITING 1ST CHOICE;  

Start 6/21/20 at 01:45;  Stop 6/22/20 at 01:44


Fentanyl Citrate (Fentanyl 2ml Vial) 25 mcg PRN Q2HRS  PRN IV SEVERE PAIN 7-10 

Last administered on 6/21/20at 12:18;  Start 6/21/20 at 01:45


Potassium Chloride (Klor-Con) 20 meq 1X  ONCE PO  Last administered on 6/21/20at

12:16;  Start 6/21/20 at 11:00;  Stop 6/21/20 at 11:05;  Status DC


Amlodipine Besylate (Norvasc) 10 mg DAILY PO  Last administered on 6/21/20at 

12:17;  Start 6/21/20 at 12:00


Aspirin (Ecotrin) 81 mg DAILY PO  Last administered on 6/21/20at 12:15;  Start 

6/21/20 at 12:00


Docusate Sodium (Colace) 100 mg PRN DAILY  PRN PO CONSTIPATION Last administered

on 6/21/20at 12:17;  Start 6/21/20 at 11:15


Vitamin D (Vitamin D3) 1,000 unit DAILY PO  Last administered on 6/21/20at 

12:17;  Start 6/21/20 at 12:00


Ferrous Sulfate (Feosol) 325 mg DAILY PO  Last administered on 6/21/20at 12:17; 

Start 6/21/20 at 12:00


Potassium Chloride (Klor-Con) 20 meq DAILY08 PO  Last administered on 6/21/20at 

12:16;  Start 6/21/20 at 12:00


Hydrochlorothiazide (Microzide) 12.5 mg DAILY PO ;  Start 6/21/20 at 12:00





Active Scripts


Active


Reported


Amoxicillin 500 Mg Capsule 1 Cap PO Q8HRS 10 Days


Docusate Sodium 100 Mg Capsule 1 Cap PO PRN DAILY PRN 30 Days


Feosol (Ferrous Sulfate) 325 Mg Tablet 1 Tab PO DAILY 30 Days


Hydrochlorothiazide Tablet (Hydrochlorothiazide) 12.5 Mg Tablet 12.5 Mg PO DAILY


Potassium Chloride ** (Potassium Chloride) 20 Meq Tablet.er 20 Meq PO DAILY08


Vitamin D2 (Ergocalciferol (Vitamin D2)) 1,250 Mcg Capsule 1,250 Mcg PO EVERY 

OTHER WEEK


Aspir 81 (Aspirin) 81 Mg Tablet.dr 1 Tab PO DAILY


Klor-Con M20 (Potassium Chloride) 20 Meq Tab.er.prt 20 Meq PO PRN DAILY PRN


Amlodipine Besylate 10 Mg Tablet 10 Mg PO DAILY





Allergies


Allergies:  


Coded Allergies:  


     ACE Inhibitors (Verified  Allergy, Severe, SWELLING ON FACE /THROAT, 

7/7/15)


     codeine (Verified  Adverse Reaction, Intermediate, nausea/vomitting, 

7/7/15)





ROS


PSYCHOLOGICAL ROS:  No: Hallucinations


Eyes:  No Loss of vision


HEENT:  No: Epistaxis


Respiratory:  No: Hemoptysis, Shortness of breath


Cardiovascular:  yes Chest Pain


Gastrointestinal:  No Nausea, No Vomiting


Genitourinary:  No Hematuria


Neurological:  No Seizures


Skin:  No Rash





Physical Exam


General:  Alert, Oriented X3


HEENT:  Atraumatic


Lungs:  Clear to auscultation


Heart:  Regular rate


Abdomen:  Soft, No tenderness


Extremities:  No edema


Neuro:  Normal speech


Psych/Mental Status:  Mood NL





Vitals


VITALS





Vital Signs








  Date Time  Temp Pulse Resp B/P (MAP) Pulse Ox O2 Delivery O2 Flow Rate FiO2


 


6/21/20 12:18   18  99 Room Air  


 


6/21/20 12:17  62  196/86    


 


6/21/20 11:03 97.6       





 97.6       











Labs


Labs





Laboratory Tests








Test


 6/20/20


23:53 6/21/20


00:40 6/21/20


05:00 6/21/20


07:45


 


White Blood Count


 5.6 x10^3/uL


(4.0-11.0) 


 


 





 


Red Blood Count


 4.17 x10^6/uL


(3.50-5.40) 


 


 





 


Hemoglobin


 11.5 g/dL


(12.0-15.5) 


 


 





 


Hematocrit


 34.1 %


(36.0-47.0) 


 


 





 


Mean Corpuscular Volume 82 fL ()    


 


Mean Corpuscular Hemoglobin 28 pg (25-35)    


 


Mean Corpuscular Hemoglobin


Concent 34 g/dL


(31-37) 


 


 





 


Red Cell Distribution Width


 16.4 %


(11.5-14.5) 


 


 





 


Platelet Count


 332 x10^3/uL


(140-400) 


 


 





 


Neutrophils (%) (Auto) 41 % (31-73)    


 


Lymphocytes (%) (Auto) 43 % (24-48)    


 


Monocytes (%) (Auto) 12 % (0-9)    


 


Eosinophils (%) (Auto) 2 % (0-3)    


 


Basophils (%) (Auto) 2 % (0-3)    


 


Neutrophils # (Auto)


 2.3 x10^3/uL


(1.8-7.7) 


 


 





 


Lymphocytes # (Auto)


 2.4 x10^3/uL


(1.0-4.8) 


 


 





 


Monocytes # (Auto)


 0.7 x10^3/uL


(0.0-1.1) 


 


 





 


Eosinophils # (Auto)


 0.1 x10^3/uL


(0.0-0.7) 


 


 





 


Basophils # (Auto)


 0.1 x10^3/uL


(0.0-0.2) 


 


 





 


Prothrombin Time


 13.3 SEC


(11.7-14.0) 


 


 





 


Prothromb Time International


Ratio 1.1 (0.8-1.1) 


 


 


 





 


Activated Partial


Thromboplast Time 35 SEC (24-38) 


 


 


 





 


D-Dimer (Ghislaine)


 0.79 ug/mlFEU


(0.00-0.50) 


 


 





 


Sodium Level


 136 mmol/L


(136-145) 


 


 





 


Potassium Level


 3.4 mmol/L


(3.5-5.1) 


 


 





 


Chloride Level


 100 mmol/L


() 


 


 





 


Carbon Dioxide Level


 28 mmol/L


(21-32) 


 


 





 


Anion Gap 8 (6-14)    


 


Blood Urea Nitrogen


 13 mg/dL


(7-20) 


 


 





 


Creatinine


 1.1 mg/dL


(0.6-1.0) 


 


 





 


Estimated GFR


(Cockcroft-Gault) 58.6 


 


 


 





 


BUN/Creatinine Ratio 12 (6-20)    


 


Glucose Level


 119 mg/dL


(70-99) 


 


 





 


Calcium Level


 9.3 mg/dL


(8.5-10.1) 


 


 





 


Magnesium Level


 1.9 mg/dL


(1.8-2.4) 


 


 





 


Total Bilirubin


 0.3 mg/dL


(0.2-1.0) 


 


 





 


Aspartate Amino Transf


(AST/SGOT) 18 U/L (15-37) 


 


 


 





 


Alanine Aminotransferase


(ALT/SGPT) 20 U/L (14-59) 


 


 


 





 


Alkaline Phosphatase


 66 U/L


() 


 


 





 


Creatine Kinase


 93 U/L


() 


 


 





 


Creatine Kinase MB (Mass)


 0.8 ng/mL


(0.0-3.6) 


 


 





 


Creatine Kinase MB Relative


Index 0.9 % (0-4) 


 


 


 





 


Troponin I Quantitative


 < 0.017 ng/mL


(0.000-0.055) 


 < 0.017 ng/mL


(0.000-0.055) < 0.017 ng/mL


(0.000-0.055)


 


NT-Pro-B-Type Natriuretic


Peptide 83 pg/mL


(0-449) 


 


 





 


Total Protein


 7.3 g/dL


(6.4-8.2) 


 


 





 


Albumin


 3.4 g/dL


(3.4-5.0) 


 


 





 


Albumin/Globulin Ratio 0.9 (1.0-1.7)    


 


Lipase


 162 U/L


() 


 


 





 


Urine Collection Type  Unknown   


 


Urine Color  Yellow   


 


Urine Clarity  Clear   


 


Urine pH  7.0 (<5.0-8.0)   


 


Urine Specific Gravity


 


 1.010


(1.000-1.030) 


 





 


Urine Protein


 


 Negative mg/dL


(NEG-TRACE) 


 





 


Urine Glucose (UA)


 


 Negative mg/dL


(NEG) 


 





 


Urine Ketones (Stick)


 


 Negative mg/dL


(NEG) 


 





 


Urine Blood  Negative (NEG)   


 


Urine Nitrite  Negative (NEG)   


 


Urine Bilirubin  Negative (NEG)   


 


Urine Urobilinogen Dipstick


 


 0.2 mg/dL (0.2


mg/dL) 


 





 


Urine Leukocyte Esterase  Negative (NEG)   


 


Urine RBC  Occ /HPF (0-2)   


 


Urine WBC  Occ /HPF (0-4)   


 


Urine Squamous Epithelial


Cells 


 Few /LPF 


 


 





 


Urine Bacteria  0 /HPF (0-FEW)   


 


Urine Mucus  Slight /LPF   








Laboratory Tests








Test


 6/20/20


23:53 6/21/20


00:40 6/21/20


05:00 6/21/20


07:45


 


White Blood Count


 5.6 x10^3/uL


(4.0-11.0) 


 


 





 


Red Blood Count


 4.17 x10^6/uL


(3.50-5.40) 


 


 





 


Hemoglobin


 11.5 g/dL


(12.0-15.5) 


 


 





 


Hematocrit


 34.1 %


(36.0-47.0) 


 


 





 


Mean Corpuscular Volume 82 fL ()    


 


Mean Corpuscular Hemoglobin 28 pg (25-35)    


 


Mean Corpuscular Hemoglobin


Concent 34 g/dL


(31-37) 


 


 





 


Red Cell Distribution Width


 16.4 %


(11.5-14.5) 


 


 





 


Platelet Count


 332 x10^3/uL


(140-400) 


 


 





 


Neutrophils (%) (Auto) 41 % (31-73)    


 


Lymphocytes (%) (Auto) 43 % (24-48)    


 


Monocytes (%) (Auto) 12 % (0-9)    


 


Eosinophils (%) (Auto) 2 % (0-3)    


 


Basophils (%) (Auto) 2 % (0-3)    


 


Neutrophils # (Auto)


 2.3 x10^3/uL


(1.8-7.7) 


 


 





 


Lymphocytes # (Auto)


 2.4 x10^3/uL


(1.0-4.8) 


 


 





 


Monocytes # (Auto)


 0.7 x10^3/uL


(0.0-1.1) 


 


 





 


Eosinophils # (Auto)


 0.1 x10^3/uL


(0.0-0.7) 


 


 





 


Basophils # (Auto)


 0.1 x10^3/uL


(0.0-0.2) 


 


 





 


Prothrombin Time


 13.3 SEC


(11.7-14.0) 


 


 





 


Prothromb Time International


Ratio 1.1 (0.8-1.1) 


 


 


 





 


Activated Partial


Thromboplast Time 35 SEC (24-38) 


 


 


 





 


D-Dimer (Ghislaine)


 0.79 ug/mlFEU


(0.00-0.50) 


 


 





 


Sodium Level


 136 mmol/L


(136-145) 


 


 





 


Potassium Level


 3.4 mmol/L


(3.5-5.1) 


 


 





 


Chloride Level


 100 mmol/L


() 


 


 





 


Carbon Dioxide Level


 28 mmol/L


(21-32) 


 


 





 


Anion Gap 8 (6-14)    


 


Blood Urea Nitrogen


 13 mg/dL


(7-20) 


 


 





 


Creatinine


 1.1 mg/dL


(0.6-1.0) 


 


 





 


Estimated GFR


(Cockcroft-Gault) 58.6 


 


 


 





 


BUN/Creatinine Ratio 12 (6-20)    


 


Glucose Level


 119 mg/dL


(70-99) 


 


 





 


Calcium Level


 9.3 mg/dL


(8.5-10.1) 


 


 





 


Magnesium Level


 1.9 mg/dL


(1.8-2.4) 


 


 





 


Total Bilirubin


 0.3 mg/dL


(0.2-1.0) 


 


 





 


Aspartate Amino Transf


(AST/SGOT) 18 U/L (15-37) 


 


 


 





 


Alanine Aminotransferase


(ALT/SGPT) 20 U/L (14-59) 


 


 


 





 


Alkaline Phosphatase


 66 U/L


() 


 


 





 


Creatine Kinase


 93 U/L


() 


 


 





 


Creatine Kinase MB (Mass)


 0.8 ng/mL


(0.0-3.6) 


 


 





 


Creatine Kinase MB Relative


Index 0.9 % (0-4) 


 


 


 





 


Troponin I Quantitative


 < 0.017 ng/mL


(0.000-0.055) 


 < 0.017 ng/mL


(0.000-0.055) < 0.017 ng/mL


(0.000-0.055)


 


NT-Pro-B-Type Natriuretic


Peptide 83 pg/mL


(0-449) 


 


 





 


Total Protein


 7.3 g/dL


(6.4-8.2) 


 


 





 


Albumin


 3.4 g/dL


(3.4-5.0) 


 


 





 


Albumin/Globulin Ratio 0.9 (1.0-1.7)    


 


Lipase


 162 U/L


() 


 


 





 


Urine Collection Type  Unknown   


 


Urine Color  Yellow   


 


Urine Clarity  Clear   


 


Urine pH  7.0 (<5.0-8.0)   


 


Urine Specific Gravity


 


 1.010


(1.000-1.030) 


 





 


Urine Protein


 


 Negative mg/dL


(NEG-TRACE) 


 





 


Urine Glucose (UA)


 


 Negative mg/dL


(NEG) 


 





 


Urine Ketones (Stick)


 


 Negative mg/dL


(NEG) 


 





 


Urine Blood  Negative (NEG)   


 


Urine Nitrite  Negative (NEG)   


 


Urine Bilirubin  Negative (NEG)   


 


Urine Urobilinogen Dipstick


 


 0.2 mg/dL (0.2


mg/dL) 


 





 


Urine Leukocyte Esterase  Negative (NEG)   


 


Urine RBC  Occ /HPF (0-2)   


 


Urine WBC  Occ /HPF (0-4)   


 


Urine Squamous Epithelial


Cells 


 Few /LPF 


 


 





 


Urine Bacteria  0 /HPF (0-FEW)   


 


Urine Mucus  Slight /LPF   











Assessment/Plan


Assessment/Plan


1.  Chest pain with mixed features.  Myocardial infarction has been ruled out.  

She was diagnosed with coronary artery vasospasm in 2002 as stated above.  2D 

echo in 2017 showed LVEF 55 to 60%.  We will obtain Lexiscan nuclear stress test

to rule out ischemia.


2.  Accelerated hypertension: Heart rate borderline low and patient allergy to 

ACE inhibitors.  Continue amlodipine and start labetalol for better blood 

pressure control.


3.  Hypokalemia: Replace orally


Thank you for your consultation











LAYA DE LA PAZ MD           Jun 21, 2020 13:16

## 2020-06-22 VITALS
DIASTOLIC BLOOD PRESSURE: 56 MMHG | SYSTOLIC BLOOD PRESSURE: 116 MMHG | SYSTOLIC BLOOD PRESSURE: 116 MMHG | DIASTOLIC BLOOD PRESSURE: 56 MMHG | SYSTOLIC BLOOD PRESSURE: 116 MMHG | DIASTOLIC BLOOD PRESSURE: 56 MMHG | SYSTOLIC BLOOD PRESSURE: 116 MMHG | DIASTOLIC BLOOD PRESSURE: 56 MMHG

## 2020-06-22 VITALS — SYSTOLIC BLOOD PRESSURE: 130 MMHG | DIASTOLIC BLOOD PRESSURE: 62 MMHG

## 2020-06-22 VITALS — DIASTOLIC BLOOD PRESSURE: 54 MMHG | SYSTOLIC BLOOD PRESSURE: 99 MMHG

## 2020-06-22 VITALS — SYSTOLIC BLOOD PRESSURE: 123 MMHG | DIASTOLIC BLOOD PRESSURE: 60 MMHG

## 2020-06-22 LAB
ANION GAP SERPL CALC-SCNC: 12 MMOL/L (ref 6–14)
BUN SERPL-MCNC: 9 MG/DL (ref 7–20)
CALCIUM SERPL-MCNC: 9.2 MG/DL (ref 8.5–10.1)
CHLORIDE SERPL-SCNC: 103 MMOL/L (ref 98–107)
CHOLEST SERPL-MCNC: 210 MG/DL (ref 0–200)
CHOLEST/HDLC SERPL: 4.3 {RATIO}
CO2 SERPL-SCNC: 25 MMOL/L (ref 21–32)
CREAT SERPL-MCNC: 1.1 MG/DL (ref 0.6–1)
GFR SERPLBLD BASED ON 1.73 SQ M-ARVRAT: 58.6 ML/MIN
GLUCOSE SERPL-MCNC: 125 MG/DL (ref 70–99)
HDLC SERPL-MCNC: 49 MG/DL (ref 40–60)
LDLC: 150 MG/DL (ref 0–100)
MAGNESIUM SERPL-MCNC: 1.9 MG/DL (ref 1.8–2.4)
POTASSIUM SERPL-SCNC: 3.2 MMOL/L (ref 3.5–5.1)
SODIUM SERPL-SCNC: 140 MMOL/L (ref 136–145)
TRIGL SERPL-MCNC: 55 MG/DL (ref 0–150)
VLDLC: 11 MG/DL (ref 0–40)

## 2020-06-22 RX ADMIN — PANTOPRAZOLE SODIUM SCH MG: 40 TABLET, DELAYED RELEASE ORAL at 10:08

## 2020-06-22 RX ADMIN — LABETALOL HCL SCH MG: 200 TABLET, FILM COATED ORAL at 10:08

## 2020-06-22 RX ADMIN — VITAMIN D, TAB 1000IU (100/BT) SCH UNIT: 25 TAB at 09:00

## 2020-06-22 RX ADMIN — ASPIRIN SCH MG: 81 TABLET, COATED ORAL at 10:07

## 2020-06-22 RX ADMIN — POTASSIUM CHLORIDE SCH MEQ: 1500 TABLET, EXTENDED RELEASE ORAL at 10:08

## 2020-06-22 RX ADMIN — Medication SCH MG: at 10:09

## 2020-06-22 NOTE — NUR
SS following for discharge planning. SS reviewed pt chart and discussed with pt RN. Pt is 
from home with spouse and is currently on room air. Discharge order on the chart for home 
with self care.

## 2020-06-22 NOTE — PDOC3
Discharge Summary


Visit Information


Date of Admission:  Jun 21, 2020


Date of Discharge:  Jun 22, 2020


Admitting Diagnosis:  Chest pain


Final Diagnosis


Problems


Medical Problems:


(1) Chest pain, rule out acute myocardial infarction


Status: Acute  





(2) Elevated d-dimer


Status: Acute  











Brief Hospital Course


Allergies





                                    Allergies








Coded Allergies Type Severity Reaction Last Updated Verified


 


  ACE Inhibitors Allergy Severe SWELLING ON FACE /THROAT 7/7/15 Yes


 


  codeine Adverse Reaction Intermediate nausea/vomitting 7/7/15 Yes








Vital Signs





Vital Signs








  Date Time  Temp Pulse Resp B/P (MAP) Pulse Ox O2 Delivery O2 Flow Rate FiO2


 


6/22/20 11:34  78  132/65    


 


6/22/20 11:00 98.4  18  98 Room Air  





 98.4       








Lab Results





Laboratory Tests








Test


 6/20/20


23:53 6/21/20


00:40 6/21/20


05:00 6/21/20


07:45


 


White Blood Count


 5.6 x10^3/uL


(4.0-11.0) 


 


 





 


Red Blood Count


 4.17 x10^6/uL


(3.50-5.40) 


 


 





 


Hemoglobin


 11.5 g/dL


(12.0-15.5) 


 


 





 


Hematocrit


 34.1 %


(36.0-47.0) 


 


 





 


Mean Corpuscular Volume 82 fL ()    


 


Mean Corpuscular Hemoglobin 28 pg (25-35)    


 


Mean Corpuscular Hemoglobin


Concent 34 g/dL


(31-37) 


 


 





 


Red Cell Distribution Width


 16.4 %


(11.5-14.5) 


 


 





 


Platelet Count


 332 x10^3/uL


(140-400) 


 


 





 


Neutrophils (%) (Auto) 41 % (31-73)    


 


Lymphocytes (%) (Auto) 43 % (24-48)    


 


Monocytes (%) (Auto) 12 % (0-9)    


 


Eosinophils (%) (Auto) 2 % (0-3)    


 


Basophils (%) (Auto) 2 % (0-3)    


 


Neutrophils # (Auto)


 2.3 x10^3/uL


(1.8-7.7) 


 


 





 


Lymphocytes # (Auto)


 2.4 x10^3/uL


(1.0-4.8) 


 


 





 


Monocytes # (Auto)


 0.7 x10^3/uL


(0.0-1.1) 


 


 





 


Eosinophils # (Auto)


 0.1 x10^3/uL


(0.0-0.7) 


 


 





 


Basophils # (Auto)


 0.1 x10^3/uL


(0.0-0.2) 


 


 





 


Prothrombin Time


 13.3 SEC


(11.7-14.0) 


 


 





 


Prothromb Time International


Ratio 1.1 (0.8-1.1) 


 


 


 





 


Activated Partial


Thromboplast Time 35 SEC (24-38) 


 


 


 





 


D-Dimer (Ghislaine)


 0.79 ug/mlFEU


(0.00-0.50) 


 


 





 


Sodium Level


 136 mmol/L


(136-145) 


 


 





 


Potassium Level


 3.4 mmol/L


(3.5-5.1) 


 


 





 


Chloride Level


 100 mmol/L


() 


 


 





 


Carbon Dioxide Level


 28 mmol/L


(21-32) 


 


 





 


Anion Gap 8 (6-14)    


 


Blood Urea Nitrogen


 13 mg/dL


(7-20) 


 


 





 


Creatinine


 1.1 mg/dL


(0.6-1.0) 


 


 





 


Estimated GFR


(Cockcroft-Gault) 58.6 


 


 


 





 


BUN/Creatinine Ratio 12 (6-20)    


 


Glucose Level


 119 mg/dL


(70-99) 


 


 





 


Calcium Level


 9.3 mg/dL


(8.5-10.1) 


 


 





 


Magnesium Level


 1.9 mg/dL


(1.8-2.4) 


 


 





 


Total Bilirubin


 0.3 mg/dL


(0.2-1.0) 


 


 





 


Aspartate Amino Transf


(AST/SGOT) 18 U/L (15-37) 


 


 


 





 


Alanine Aminotransferase


(ALT/SGPT) 20 U/L (14-59) 


 


 


 





 


Alkaline Phosphatase


 66 U/L


() 


 


 





 


Creatine Kinase


 93 U/L


() 


 


 





 


Creatine Kinase MB (Mass)


 0.8 ng/mL


(0.0-3.6) 


 


 





 


Creatine Kinase MB Relative


Index 0.9 % (0-4) 


 


 


 





 


Troponin I Quantitative


 < 0.017 ng/mL


(0.000-0.055) 


 < 0.017 ng/mL


(0.000-0.055) < 0.017 ng/mL


(0.000-0.055)


 


NT-Pro-B-Type Natriuretic


Peptide 83 pg/mL


(0-449) 


 


 





 


Total Protein


 7.3 g/dL


(6.4-8.2) 


 


 





 


Albumin


 3.4 g/dL


(3.4-5.0) 


 


 





 


Albumin/Globulin Ratio 0.9 (1.0-1.7)    


 


Lipase


 162 U/L


() 


 


 





 


Urine Collection Type  Unknown   


 


Urine Color  Yellow   


 


Urine Clarity  Clear   


 


Urine pH  7.0 (<5.0-8.0)   


 


Urine Specific Gravity


 


 1.010


(1.000-1.030) 


 





 


Urine Protein


 


 Negative mg/dL


(NEG-TRACE) 


 





 


Urine Glucose (UA)


 


 Negative mg/dL


(NEG) 


 





 


Urine Ketones (Stick)


 


 Negative mg/dL


(NEG) 


 





 


Urine Blood  Negative (NEG)   


 


Urine Nitrite  Negative (NEG)   


 


Urine Bilirubin  Negative (NEG)   


 


Urine Urobilinogen Dipstick


 


 0.2 mg/dL (0.2


mg/dL) 


 





 


Urine Leukocyte Esterase  Negative (NEG)   


 


Urine RBC  Occ /HPF (0-2)   


 


Urine WBC  Occ /HPF (0-4)   


 


Urine Squamous Epithelial


Cells 


 Few /LPF 


 


 





 


Urine Bacteria  0 /HPF (0-FEW)   


 


Urine Mucus  Slight /LPF   


 


Triglycerides Level


 


 


 55 mg/dL


(0-150) 





 


Cholesterol Level


 


 


 210 mg/dL


(0-200) 





 


LDL Cholesterol, Calculated


 


 


 150 mg/dL


(0-100) 





 


VLDL Cholesterol, Calculated


 


 


 11 mg/dL


(0-40) 





 


Non-HDL Cholesterol Calculated


 


 


 161 mg/dL


(0-129) 





 


HDL Cholesterol


 


 


 49 mg/dL


(40-60) 





 


Cholesterol/HDL Ratio   4.3  


 


Test


 6/22/20


10:28 


 


 





 


Sodium Level


 140 mmol/L


(136-145) 


 


 





 


Potassium Level


 3.2 mmol/L


(3.5-5.1) 


 


 





 


Chloride Level


 103 mmol/L


() 


 


 





 


Carbon Dioxide Level


 25 mmol/L


(21-32) 


 


 





 


Anion Gap 12 (6-14)    


 


Blood Urea Nitrogen 9 mg/dL (7-20)    


 


Creatinine


 1.1 mg/dL


(0.6-1.0) 


 


 





 


Estimated GFR


(Cockcroft-Gault) 58.6 


 


 


 





 


Glucose Level


 125 mg/dL


(70-99) 


 


 





 


Calcium Level


 9.2 mg/dL


(8.5-10.1) 


 


 





 


Magnesium Level


 1.9 mg/dL


(1.8-2.4) 


 


 





 


Procalcitonin


 < 0.10 ng/mL


(0.00-0.10) 


 


 











Laboratory Tests








Test


 6/22/20


10:28


 


Sodium Level


 140 mmol/L


(136-145)


 


Potassium Level


 3.2 mmol/L


(3.5-5.1)


 


Chloride Level


 103 mmol/L


()


 


Carbon Dioxide Level


 25 mmol/L


(21-32)


 


Anion Gap 12 (6-14) 


 


Blood Urea Nitrogen 9 mg/dL (7-20) 


 


Creatinine


 1.1 mg/dL


(0.6-1.0)


 


Estimated GFR


(Cockcroft-Gault) 58.6 





 


Glucose Level


 125 mg/dL


(70-99)


 


Calcium Level


 9.2 mg/dL


(8.5-10.1)


 


Magnesium Level


 1.9 mg/dL


(1.8-2.4)


 


Procalcitonin


 < 0.10 ng/mL


(0.00-0.10)








Brief Hospital Course


Ms Pires is a 76 yo F w/ PMHx recent COVID 19 infection, HTN who comes to ED 

c/o chest pain. Found with /92, admitted for further care.


CT with no evidence of pulmonary embolism, but faint airspace opacities 

identified in the right upper lobe, right middle lobe and left lingula likely 

atelectasis or infiltrates. Negative procalcitonin, no pneumonia concern. Her 

potassium was 3.2, advised to stop HCTZ and start amlodipine for HTN instead.





She just completed her nuclear medicine stress test.


Conclusion


1. Regadenoson cardioisotope stress test did not show any evidence of ischemia 

or infarct.


2. Normal left ventricular systolic function with ejection fraction calculated 

at 78%.


3. Low risk for cardiac events.





She is feeling better says her pain is 0.5/10 today after starting on PPI, no 

cough or shortness of breath.  She is eating breakfast, and asking if she can 

discharge home today.





Consults: Cardiology.





Problem list:


Chest pain with mixed features.  Myocardial infarction has been ruled out.  She 

was diagnosed with coronary artery vasospasm in 2002 as stated above.  2D echo 

in 2017 showed LVEF 55 to 60%.  We will obtain Lexiscan nuclear stress test to 

rule out ischemia.


Accelerated hypertension/Hypertensive urgency


Hypokalemia - replaced. Stop HCTZ


H/o recent SARS-CoV-2 (COVID 19) infection 4/4/2020


Right lower lobe atelectasis vs infiltrate


Essential hypertension


History of dyslipidemia





Greater than 30 minutes spent on d/c





Discharge Information


Condition at Discharge:  Improved


Follow Up:  Weeks (1)


Disposition/Orders:  D/C to Home


Scheduled


Amlodipine Besylate (Amlodipine Besylate) 10 Mg Tablet, 10 MG PO DAILY for HTN 

for 90 Days, #90


   Prescribed by: ZACKERY HERNANDEZ MD on 6/22/20 1236


Aspirin (Aspir 81) 81 Mg Tablet.dr, 1 TAB PO DAILY, #30 Ref 5 (Reported)


   Entered as Reported by: PARTHA CABRAL on 2/24/17 1536


   Last Action: Continued on 6/21/20 1111 by TYE BRONSON MD


Ergocalciferol (Vitamin D2) (Vitamin D2) 1,250 Mcg Capsule, 1,250 MCG PO every 

other week for supplement, (Reported)


   Entered as Reported by: LAWRENCE NAVARRETE on 6/21/20 0250


   Last Action: Continued on 6/21/20 1111 by TYE BRONSON MD


Ferrous Sulfate (Feosol) 325 Mg Tablet, 1 TAB PO DAILY for anemia for 30 Days, 

#30 Ref 0 (Reported)


   Entered as Reported by: LAWRENCE NAVARRETE on 6/21/20 0422


   Last Action: Continued on 6/21/20 1111 by TYE BRONSON MD


Pantoprazole Sodium (Pantoprazole Sodium  **) 40 Mg Tablet.dr, 40 MG PO DAILYAC 

for GERD for 30 Days, #30


   Prescribed by: ZACKERY HERNANDEZ MD on 6/22/20 1234


Potassium Chloride (Potassium Chloride **) 20 Meq Tablet.er, 20 MEQ PO DAILY08 

for SUPPLEMENT, (Reported)


   Entered as Reported by: LAWRENCE NAVARRETE on 6/21/20 0250


   Last Action: Continued on 6/21/20 1111 by TYE BRONSON MD





Scheduled PRN


Docusate Sodium (Docusate Sodium) 100 Mg Capsule, 1 CAP PO PRN DAILY PRN for 

CONSTIPATION for 30 Days, Ref 0 (Reported)


   Entered as Reported by: LAWRENCE NAVARRETE on 6/21/20 0422


   Last Action: Continued on 6/21/20 1111 by TYE BRONSON MD


Potassium Chloride (Klor-Con M20) 20 Meq Tab.er.prt, 20 MEQ PO PRN DAILY PRN for

leg cramps, (Reported)


   Entered as Reported by: LUCINDA BLAKE on 4/29/14 0609


   Last Action: HELD on 6/21/20 1111 by TYE BRONSON MD





Discontinued Medications


Amoxicillin (Amoxicillin) 500 Mg Capsule, 1 CAP PO Q8HRS for tooth infection for

10 Days, (Reported)


   Entered as Reported by: LAWRENCE NAVARRETE on 6/21/20 0424


   Last Taken: Unknown Dose on 6/20/20      Last Action: HELD on 6/21/20 1111 by

TYE BRONSON MD


Hydrochlorothiazide (Hydrochlorothiazide Tablet) 12.5 Mg Tablet, 12.5 MG PO 

DAILY for diuretic, (Reported)


   Entered as Reported by: LAWRENCE NAVARRETE on 6/21/20 0250


   Last Action: Converted on 6/21/20 1111 by TYE BRONSON MD





Justicifation of Admission Dx:


Justifications for Admission:


Justification of Admission Dx:  Yes


Aspiration Pneumonia:  Chronic Lung Disease


Angina:  Cresendo Worsening of Sym


Hypertension:  Cresendo Worsening of Sym











ZACKERY HERNANDEZ MD        Jun 22, 2020 12:30

## 2020-06-22 NOTE — RAD
EXAM: Bilateral lower extremity venous Doppler sonogram.

 

HISTORY: Pain and swelling.

 

TECHNIQUE: Gray scale and color Doppler sonographic evaluation of the 

bilateral lower extremity veins with spectral waveform analysis was 

performed.

 

FINDINGS: There is normal color flow, normal compressibility and there are

normal spectral waveforms in the common femoral, superficial femoral, 

popliteal, posterior tibial and greater saphenous veins.

 

IMPRESSION: No Doppler evidence of lower extremity deep venous thrombosis.

 

Electronically signed by: Mary Carmen Griffiths MD (6/22/2020 7:10 AM) [id number]

## 2020-06-22 NOTE — NUR
Discharge Note:



ANDREE ESTES [address] [place] [address]



Discharge instructions and discharge home medications reviewed with Patient and a copy 
given. All questions have been answered and understanding verbalized. 



The following instructions and handouts were given: discharge instructions, amlodipine info, 
protonix info, HTN info, hypotension info, hypokalemia info, CP info, follow ups. 



Discontinued lines and drains: Peripheral IV intact.



Patient discharged to Home or Self Care with Spouse via [facility] at 1510.

## 2020-06-22 NOTE — PDOC
PROGRESS NOTES


Subjective


Subjective


Feeling much better today with improvement in chest pain





Objective


Objective





Vital Signs








  Date Time  Temp Pulse Resp B/P (MAP) Pulse Ox O2 Delivery O2 Flow Rate FiO2


 


6/22/20 11:34  78  132/65    


 


6/22/20 11:00 98.4  18  98 Room Air  





 98.4       














Intake and Output 


 


 6/22/20





 07:00


 


Intake Total 900 ml


 


Output Total 1030 ml


 


Balance -130 ml


 


 


 


Intake Oral 900 ml


 


Output Urine Total 1030 ml











Physical Exam


Abdomen:  Soft, No tenderness


Heart:  Regular rate


Extremities:  No cyanosis


General:  Alert, Oriented X3, Cooperative, No acute distress


HEENT:  EOMI, Mucous membr. moist/pink


Lungs:  Clear to auscultation, Normal air movement


MUSCULOSKELETAL:  Full range of motion without pain, Osteoarthritic changes both

hands


Neuro:  Normal speech, Cranial nerves 3-12 NL


Psych/Mental Status:  Mental status NL, Mood NL





Assessment


Assessment


1.  Chest pain with mixed features.  Myocardial infarction has been ruled out.  

She was diagnosed with coronary artery vasospasm in 2002.  2D echo in 2017 

showed LVEF 55 to 60%.  Lexiscan nuclear stress test did not show any 

significant ischemia.


2.  Accelerated hypertension: Much better controlled with labetalol.  Continue 

current medical regimen.


3.  Hypokalemia: Replace orally





Plan


Plan of Care


Problems


Medical Problems:


(1) Chest pain, rule out acute myocardial infarction


Status: Acute  





(2) Elevated d-dimer


Status: Acute  











Comment


Review of Relevant


I have reviewed the following items li (where applicable) has been applied.


Labs





Laboratory Tests








Test


 6/22/20


10:28


 


Sodium Level


 140 mmol/L


(136-145)


 


Potassium Level


 3.2 mmol/L


(3.5-5.1)


 


Chloride Level


 103 mmol/L


()


 


Carbon Dioxide Level


 25 mmol/L


(21-32)


 


Anion Gap 12 (6-14) 


 


Blood Urea Nitrogen 9 mg/dL (7-20) 


 


Creatinine


 1.1 mg/dL


(0.6-1.0)


 


Estimated GFR


(Cockcroft-Gault) 58.6 





 


Glucose Level


 125 mg/dL


(70-99)


 


Calcium Level


 9.2 mg/dL


(8.5-10.1)


 


Magnesium Level


 1.9 mg/dL


(1.8-2.4)


 


Procalcitonin


 < 0.10 ng/mL


(0.00-0.10)








Medications





Current Medications


Acetaminophen (Tylenol) 650 mg PRN Q4HRS  PRN PO TEMP OVER 100.4F OR MILD PAIN; 

Start 6/21/20 at 17:15


Albuterol Sulfate (Ventolin Neb Soln) 2.5 mg PRN Q4HRS  PRN NEB SHORTNESS OF 

BREATH;  Start 6/21/20 at 17:15


Clonidine HCl (Catapres) 0.1 mg PRN Q6HRS  PRN PO SBP>160 OR DBP>90;  Start 

6/21/20 at 17:15


Diphenhydramine HCl (Benadryl) 25 mg PRN Q4HRS  PRN IVP ITCHING;  Start 6/21/20 

at 17:15


Docusate Sodium (Colace) 100 mg PRN BID  PRN PO HARD STOOLS;  Start 6/21/20 at 

17:15


Enoxaparin Sodium (Lovenox 40mg Syringe) 40 mg Q24H SQ  Last administered on 6/2 1/20at 17:37;  Start 6/21/20 at 18:00


Guaifenesin (Robitussin) 200 mg PRN Q4HRS  PRN PO COUGH;  Start 6/21/20 at 17:15


Lorazepam (Ativan) 0.5 mg PRN Q4HRS  PRN PO ANXIETY / AGITATION;  Start 6/21/20 

at 17:15


Nitroglycerin (Nitrostat) 0.4 mg PRN Q5MIN  PRN SL CHEST PAIN Last administered 

on 6/21/20at 17:38;  Start 6/21/20 at 17:00


Ondansetron HCl (Zofran) 4 mg PRN Q4HRS  PRN IV NAUSEA/VOMITING;  Start 6/21/20 

at 17:15


Pantoprazole Sodium (Protonix) 40 mg DAILYAC PO  Last administered on 6/22/20at 

10:08;  Start 6/21/20 at 17:30


Potassium Chloride (Klor-Con) 40 meq 1X  ONCE PO  Last administered on 6/22/20at

13:54;  Start 6/22/20 at 12:30;  Stop 6/22/20 at 12:31;  Status DC


Potassium Chloride (Klor-Con) 40 meq 1222  ONCE PO  Last administered on 

6/22/20at 12:45;  Start 6/22/20 at 12:22;  Stop 6/22/20 at 12:29;  Status DC


Regadenoson (Lexiscan) 0.4 mg 1X  ONCE IV  Last administered on 6/22/20at 08:15;

 Start 6/22/20 at 08:15;  Stop 6/22/20 at 08:16;  Status DC


Sodium Monofluorophosphate (Fleet Adult) 133 ml PRN DAILY  PRN NE CONSTIPATION; 

Start 6/21/20 at 17:15


Sodium Chloride (Normal Saline Flush) 3 ml QSHIFT  PRN IV AFTER MEDS AND BLOOD 

DRAWS;  Start 6/21/20 at 17:15


Zolpidem Tartrate (Ambien) 5 mg PRN QHS  PRN PO INSOMNIA;  Start 6/21/20 at 

17:15


Vitals/I & O





Vital Sign - Last 24 Hours








 6/21/20 6/21/20 6/21/20 6/21/20





 14:34 14:49 17:36 17:38


 


Temp  98.3  





  98.3  


 


Pulse 66 63  74


 


Resp  19 18 


 


B/P (MAP) 191/79 191/79 (116)  149/67


 


Pulse Ox  97 97 


 


O2 Delivery  Room Air Room Air 


 


    





    





 6/21/20 6/21/20 6/21/20 6/21/20





 18:06 19:00 20:00 20:41


 


Temp  98.1  





  98.1  


 


Pulse  78  


 


Resp 18 18  


 


B/P (MAP)  118/57 (77)  


 


Pulse Ox 97 97  96


 


O2 Delivery Room Air Room Air Room Air 


 


    





    





 6/21/20 6/21/20 6/22/20 6/22/20





 22:40 23:00 02:35 07:00


 


Temp  97.9 97.5 97.9





  97.9 97.5 97.9


 


Pulse 78 77 66 73


 


Resp  19 18 12


 


B/P (MAP) 118/57 115/63 (80) 99/54 (69) 123/60 (81)


 


Pulse Ox  96 94 98


 


O2 Delivery  Room Air Room Air Room Air


 


    





    





 6/22/20 6/22/20 6/22/20 6/22/20





 08:00 10:08 11:00 11:34


 


Temp   98.4 





   98.4 


 


Pulse  76 85 78


 


Resp   18 


 


B/P (MAP)  146/67 130/62 (84) 132/65


 


Pulse Ox   98 


 


O2 Delivery Room Air  Room Air 














Intake and Output 0  


 


 6/21/20 6/21/20 6/22/20





 15:00 23:00 07:00


 


Intake Total  800 ml 100 ml


 


Output Total 780 ml 250 ml 


 


Balance -780 ml 550 ml 100 ml

















LAYA DE LA PAZ MD           Jun 22, 2020 14:31

## 2020-06-22 NOTE — PDOC
PROGRESS NOTES


Chief Complaint


Chief Complaint


A/P:


Chest pain with mixed features.  Myocardial infarction has been ruled out.  She 

was diagnosed with coronary artery vasospasm in 2002 as stated above.  2D echo i

n 2017 showed LVEF 55 to 60%.  We will obtain Lexiscan nuclear stress test to 

rule out ischemia.


Accelerated hypertension


Hypokalemia


H/o recent SARS-CoV-2 (COVID 19) infection 4/4/2020


Right lower lobe atelectasis vs infiltrate


Essential hypertension


History of dyslipidemia





History of Present Illness


History of Present Illness


Ms Pires is a 74 yo F w/ PMHx who comes to ED c/o chest pain.


CT with no evidence of pulmonary embolism, but faint airspace opacities 

identified in the right upper lobe, right middle lobe and left lingula likely 

atelectasis or infiltrates.





She just completed her nuclear medicine stress test.  She is feeling better says

her pain is 0.5/10 today, no cough or shortness of breath.  She is eating 

breakfast, and asking if she can discharge home today.





Vitals


Vitals





Vital Signs








  Date Time  Temp Pulse Resp B/P (MAP) Pulse Ox O2 Delivery O2 Flow Rate FiO2


 


6/22/20 07:00 97.9 73 12 123/60 (81) 98 Room Air  





 97.9       











Physical Exam


General:  Alert, Oriented X3, Cooperative, No acute distress


Heart:  Regular rate


Lungs:  Wheezing, Crackles


Abdomen:  Soft, No tenderness


Extremities:  No cyanosis





Assessment and Plan


Assessmemt and Plan


Problems


Medical Problems:


(1) Chest pain, rule out acute myocardial infarction


Status: Acute  





(2) Elevated d-dimer


Status: Acute  











Comment


Review of Relevant


I have reviewed the following items li (where applicable) has been applied.


Labs





Laboratory Tests








Test


 6/20/20


23:53 6/21/20


00:40 6/21/20


05:00 6/21/20


07:45


 


White Blood Count


 5.6 x10^3/uL


(4.0-11.0) 


 


 





 


Red Blood Count


 4.17 x10^6/uL


(3.50-5.40) 


 


 





 


Hemoglobin


 11.5 g/dL


(12.0-15.5) 


 


 





 


Hematocrit


 34.1 %


(36.0-47.0) 


 


 





 


Mean Corpuscular Volume 82 fL ()    


 


Mean Corpuscular Hemoglobin 28 pg (25-35)    


 


Mean Corpuscular Hemoglobin


Concent 34 g/dL


(31-37) 


 


 





 


Red Cell Distribution Width


 16.4 %


(11.5-14.5) 


 


 





 


Platelet Count


 332 x10^3/uL


(140-400) 


 


 





 


Neutrophils (%) (Auto) 41 % (31-73)    


 


Lymphocytes (%) (Auto) 43 % (24-48)    


 


Monocytes (%) (Auto) 12 % (0-9)    


 


Eosinophils (%) (Auto) 2 % (0-3)    


 


Basophils (%) (Auto) 2 % (0-3)    


 


Neutrophils # (Auto)


 2.3 x10^3/uL


(1.8-7.7) 


 


 





 


Lymphocytes # (Auto)


 2.4 x10^3/uL


(1.0-4.8) 


 


 





 


Monocytes # (Auto)


 0.7 x10^3/uL


(0.0-1.1) 


 


 





 


Eosinophils # (Auto)


 0.1 x10^3/uL


(0.0-0.7) 


 


 





 


Basophils # (Auto)


 0.1 x10^3/uL


(0.0-0.2) 


 


 





 


Prothrombin Time


 13.3 SEC


(11.7-14.0) 


 


 





 


Prothromb Time International


Ratio 1.1 (0.8-1.1) 


 


 


 





 


Activated Partial


Thromboplast Time 35 SEC (24-38) 


 


 


 





 


D-Dimer (Ghislaine)


 0.79 ug/mlFEU


(0.00-0.50) 


 


 





 


Sodium Level


 136 mmol/L


(136-145) 


 


 





 


Potassium Level


 3.4 mmol/L


(3.5-5.1) 


 


 





 


Chloride Level


 100 mmol/L


() 


 


 





 


Carbon Dioxide Level


 28 mmol/L


(21-32) 


 


 





 


Anion Gap 8 (6-14)    


 


Blood Urea Nitrogen


 13 mg/dL


(7-20) 


 


 





 


Creatinine


 1.1 mg/dL


(0.6-1.0) 


 


 





 


Estimated GFR


(Cockcroft-Gault) 58.6 


 


 


 





 


BUN/Creatinine Ratio 12 (6-20)    


 


Glucose Level


 119 mg/dL


(70-99) 


 


 





 


Calcium Level


 9.3 mg/dL


(8.5-10.1) 


 


 





 


Magnesium Level


 1.9 mg/dL


(1.8-2.4) 


 


 





 


Total Bilirubin


 0.3 mg/dL


(0.2-1.0) 


 


 





 


Aspartate Amino Transf


(AST/SGOT) 18 U/L (15-37) 


 


 


 





 


Alanine Aminotransferase


(ALT/SGPT) 20 U/L (14-59) 


 


 


 





 


Alkaline Phosphatase


 66 U/L


() 


 


 





 


Creatine Kinase


 93 U/L


() 


 


 





 


Creatine Kinase MB (Mass)


 0.8 ng/mL


(0.0-3.6) 


 


 





 


Creatine Kinase MB Relative


Index 0.9 % (0-4) 


 


 


 





 


Troponin I Quantitative


 < 0.017 ng/mL


(0.000-0.055) 


 < 0.017 ng/mL


(0.000-0.055) < 0.017 ng/mL


(0.000-0.055)


 


NT-Pro-B-Type Natriuretic


Peptide 83 pg/mL


(0-449) 


 


 





 


Total Protein


 7.3 g/dL


(6.4-8.2) 


 


 





 


Albumin


 3.4 g/dL


(3.4-5.0) 


 


 





 


Albumin/Globulin Ratio 0.9 (1.0-1.7)    


 


Lipase


 162 U/L


() 


 


 





 


Urine Collection Type  Unknown   


 


Urine Color  Yellow   


 


Urine Clarity  Clear   


 


Urine pH  7.0 (<5.0-8.0)   


 


Urine Specific Gravity


 


 1.010


(1.000-1.030) 


 





 


Urine Protein


 


 Negative mg/dL


(NEG-TRACE) 


 





 


Urine Glucose (UA)


 


 Negative mg/dL


(NEG) 


 





 


Urine Ketones (Stick)


 


 Negative mg/dL


(NEG) 


 





 


Urine Blood  Negative (NEG)   


 


Urine Nitrite  Negative (NEG)   


 


Urine Bilirubin  Negative (NEG)   


 


Urine Urobilinogen Dipstick


 


 0.2 mg/dL (0.2


mg/dL) 


 





 


Urine Leukocyte Esterase  Negative (NEG)   


 


Urine RBC  Occ /HPF (0-2)   


 


Urine WBC  Occ /HPF (0-4)   


 


Urine Squamous Epithelial


Cells 


 Few /LPF 


 


 





 


Urine Bacteria  0 /HPF (0-FEW)   


 


Urine Mucus  Slight /LPF   


 


Triglycerides Level


 


 


 55 mg/dL


(0-150) 





 


Cholesterol Level


 


 


 210 mg/dL


(0-200) 





 


LDL Cholesterol, Calculated


 


 


 150 mg/dL


(0-100) 





 


VLDL Cholesterol, Calculated


 


 


 11 mg/dL


(0-40) 





 


Non-HDL Cholesterol Calculated


 


 


 161 mg/dL


(0-129) 





 


HDL Cholesterol


 


 


 49 mg/dL


(40-60) 





 


Cholesterol/HDL Ratio   4.3  








Medications





Current Medications


Aspirin (Guru Aspirin) 325 mg 1X  ONCE PO ;  Start 6/21/20 at 00:30;  Stop 

6/21/20 at 00:23;  Status DC


Sodium Chloride 1,000 ml @  1,000 mls/hr 1X  ONCE IV  Last administered on 

6/21/20at 00:44;  Start 6/21/20 at 00:30;  Stop 6/21/20 at 01:29;  Status DC


Aspirin (Aspirin Chewable) 243 mg 1X  ONCE PO  Last administered on 6/21/20at 

00:44;  Start 6/21/20 at 01:00;  Stop 6/21/20 at 01:01;  Status DC


Iohexol (Omnipaque 350 Mg/ml) 90 ml 1X  ONCE IV  Last administered on 6/21/20at 

01:35;  Start 6/21/20 at 02:00;  Stop 6/21/20 at 02:01;  Status DC


Info (CONTRAST GIVEN -- Rx MONITORING) 1 each PRN DAILY  PRN MC SEE COMMENTS;  

Start 6/21/20 at 01:30;  Stop 6/23/20 at 01:29


Ondansetron HCl (Zofran) 4 mg PRN Q8HRS  PRN IV NAUSEA/VOMITING 1ST CHOICE;  

Start 6/21/20 at 01:45;  Stop 6/22/20 at 01:44;  Status DC


Fentanyl Citrate (Fentanyl 2ml Vial) 25 mcg PRN Q2HRS  PRN IV SEVERE PAIN 7-10 

Last administered on 6/21/20at 17:36;  Start 6/21/20 at 01:45


Potassium Chloride (Klor-Con) 20 meq 1X  ONCE PO  Last administered on 6/21/20at

12:16;  Start 6/21/20 at 11:00;  Stop 6/21/20 at 11:05;  Status DC


Amlodipine Besylate (Norvasc) 10 mg DAILY PO  Last administered on 6/21/20at 

12:17;  Start 6/21/20 at 12:00


Aspirin (Ecotrin) 81 mg DAILY PO  Last administered on 6/21/20at 12:15;  Start 

6/21/20 at 12:00


Docusate Sodium (Colace) 100 mg PRN DAILY  PRN PO CONSTIPATION Last administered

on 6/21/20at 12:17;  Start 6/21/20 at 11:15;  Stop 6/21/20 at 17:06;  Status DC


Vitamin D (Vitamin D3) 1,000 unit DAILY PO  Last administered on 6/21/20at 

12:17;  Start 6/21/20 at 12:00


Ferrous Sulfate (Feosol) 325 mg DAILY PO  Last administered on 6/21/20at 12:17; 

Start 6/21/20 at 12:00


Potassium Chloride (Klor-Con) 20 meq DAILY08 PO  Last administered on 6/21/20at 

12:16;  Start 6/21/20 at 12:00


Hydrochlorothiazide (Microzide) 12.5 mg DAILY PO ;  Start 6/21/20 at 12:00


Labetalol HCl (Trandate) 200 mg BID PO  Last administered on 6/21/20at 22:40;  

Start 6/21/20 at 13:30


Nitroglycerin (Nitrostat) 0.4 mg PRN Q5MIN  PRN SL CHEST PAIN Last administered 

on 6/21/20at 17:38;  Start 6/21/20 at 17:00


Sodium Chloride (Normal Saline Flush) 3 ml QSHIFT  PRN IV AFTER MEDS AND BLOOD 

DRAWS;  Start 6/21/20 at 17:15


Ondansetron HCl (Zofran) 4 mg PRN Q4HRS  PRN IV NAUSEA/VOMITING;  Start 6/21/20 

at 17:15


Zolpidem Tartrate (Ambien) 5 mg PRN QHS  PRN PO INSOMNIA;  Start 6/21/20 at 

17:15


Acetaminophen (Tylenol) 650 mg PRN Q4HRS  PRN PO TEMP OVER 100.4F OR MILD PAIN; 

Start 6/21/20 at 17:15


Clonidine HCl (Catapres) 0.1 mg PRN Q6HRS  PRN PO SBP>160 OR DBP>90;  Start 

6/21/20 at 17:15


Sodium Monofluorophosphate (Fleet Adult) 133 ml PRN DAILY  PRN PA CONSTIPATION; 

Start 6/21/20 at 17:15


Diphenhydramine HCl (Benadryl) 25 mg PRN Q4HRS  PRN IVP ITCHING;  Start 6/21/20 

at 17:15


Docusate Sodium (Colace) 100 mg PRN BID  PRN PO HARD STOOLS;  Start 6/21/20 at 

17:15


Albuterol Sulfate (Ventolin Neb Soln) 2.5 mg PRN Q4HRS  PRN NEB SHORTNESS OF 

BREATH;  Start 6/21/20 at 17:15


Guaifenesin (Robitussin) 200 mg PRN Q4HRS  PRN PO COUGH;  Start 6/21/20 at 17:15


Lorazepam (Ativan) 0.5 mg PRN Q4HRS  PRN PO ANXIETY / AGITATION;  Start 6/21/20 

at 17:15


Enoxaparin Sodium (Lovenox 40mg Syringe) 40 mg Q24H SQ  Last administered on 

6/21/20at 17:37;  Start 6/21/20 at 18:00


Pantoprazole Sodium (Protonix) 40 mg DAILYAC PO  Last administered on 6/21/20at 

17:38;  Start 6/21/20 at 17:30


Regadenoson (Lexiscan) 0.4 mg 1X  ONCE IV ;  Start 6/22/20 at 08:15;  Stop 

6/22/20 at 08:16;  Status DC





Active Scripts


Active


Reported


Amoxicillin 500 Mg Capsule 1 Cap PO Q8HRS 10 Days


Docusate Sodium 100 Mg Capsule 1 Cap PO PRN DAILY PRN 30 Days


Feosol (Ferrous Sulfate) 325 Mg Tablet 1 Tab PO DAILY 30 Days


Hydrochlorothiazide Tablet (Hydrochlorothiazide) 12.5 Mg Tablet 12.5 Mg PO DAILY


Potassium Chloride ** (Potassium Chloride) 20 Meq Tablet.er 20 Meq PO DAILY08


Vitamin D2 (Ergocalciferol (Vitamin D2)) 1,250 Mcg Capsule 1,250 Mcg PO EVERY 

OTHER WEEK


Aspir 81 (Aspirin) 81 Mg Tablet.dr 1 Tab PO DAILY


Klor-Con M20 (Potassium Chloride) 20 Meq Tab.er.prt 20 Meq PO PRN DAILY PRN


Amlodipine Besylate 10 Mg Tablet 10 Mg PO DAILY


Vitals/I & O





Vital Sign - Last 24 Hours








 6/21/20 6/21/20 6/21/20 6/21/20





 11:03 12:17 12:18 12:48


 


Temp 97.6   





 97.6   


 


Pulse 62 62  


 


Resp 19  18 18


 


B/P (MAP) 196/86 (122) 196/86  


 


Pulse Ox 99  99 99


 


O2 Delivery Room Air  Room Air Room Air


 


    





    





 6/21/20 6/21/20 6/21/20 6/21/20





 14:34 14:49 17:36 17:38


 


Temp  98.3  





  98.3  


 


Pulse 66 63  74


 


Resp  19 18 


 


B/P (MAP) 191/79 191/79 (116)  149/67


 


Pulse Ox  97 97 


 


O2 Delivery  Room Air Room Air 


 


    





    





 6/21/20 6/21/20 6/21/20 6/21/20





 18:06 19:00 20:00 20:41


 


Temp  98.1  





  98.1  


 


Pulse  78  


 


Resp 18 18  


 


B/P (MAP)  118/57 (77)  


 


Pulse Ox 97 97  96


 


O2 Delivery Room Air Room Air Room Air 


 


    





    





 6/21/20 6/21/20 6/22/20 6/22/20





 22:40 23:00 02:35 07:00


 


Temp  97.9 97.5 97.9





  97.9 97.5 97.9


 


Pulse 78 77 66 73


 


Resp  19 18 12


 


B/P (MAP) 118/57 115/63 (80) 99/54 (69) 123/60 (81)


 


Pulse Ox  96 94 98


 


O2 Delivery  Room Air Room Air Room Air














Intake and Output   


 


 6/21/20 6/21/20 6/22/20





 15:00 23:00 07:00


 


Intake Total  800 ml 100 ml


 


Output Total 780 ml 250 ml 


 


Balance -780 ml 550 ml 100 ml

















ZACKERY HERNANDEZ MD        Jun 22, 2020 09:07

## 2020-06-22 NOTE — RAD
MR#: [id number]

Account#: [id number]

Accession#: [id number]

Date of Study: 06/22/2020

Ordering Physician: LAYA DE LA PAZ, 

Referring Physician: JACKLYN BREAUX Tech: RT Jun (R) (N)





--------------- APPROVED REPORT --------------





Test Type:          Pharmacological

Stress Nurse/Tech: Lana Man R.N.

Test Indications: chest pain

Cardiac History: htn

Medications:     see emar

Medical History: see emr

Resting ECG:     SR with inverted Ts in V leads

Resting Heart Rate: 65 bpm

Resting Blood Pressure: 124/60mmHg



Nurse/Tech Notes

lungs cta, heart tones regular, pt describes very minimal chest pain, less than a 1

Consent: The procedure was explained to the patient in lay terms. Informed consent was witnessed. Sanjiv
eout was entered into [facility]. History and Stress Test performed by AMANDA Sin



Pharm. Details

Pharmacologic stress testing was performed using 0.4mg per 5ml of regadenoson given intravenously ove
r 7-10 seconds.



Stress Symptoms

No chest pain or symptoms.



POST EXERCISE

Reason for Termination: Infusion complete

Target HR: No

Max HR: 105 bpm

Max Blood Pressure: 131/63mmHg

Chest Pain: No. 

Arrhythmia: No. 

ST Change: No. 



INTERPRETATION

Stress EKG Conclusion: Baseline EKG showed sinus rhythm.  No ischemic changes at peak stress.  No arr
hythmias.



Imaging Protocol

IMAGE PROTOCOL: Rest Tc-99m/stress Tc-99m 1 day



Rest:            Stress:         Viability:   

Radiopharm.Tc99m DivafvdizQn73h Sestamibi

[id number]            33mCi            

Duration    13min.           13min.           

Img Date  06/22/2020 06/22/2020      

Inj-Img [id number].           60min.           



Rest Admin Site:IV - Left AntecubitalAdministrator:RT JONY Barahona)(N)

Stress Admin Site: IV - Left AntecubitalAdministrator: AMANDA Sin



STRESS DATA

End Diast. Vol.42.0mlLVEDV index BSA23.0ml

End Syst. Vol.9.0mlLVESV index BSA5.0ml

Myocardial Mass92.0gEject. Ihovduin38.0%



Stress Scores

Regional WT2.00Summed WT9.00

Regional WM0.00Summed WM3.00



Study quality was good.  Left Ventricular size was Normal at Rest and Stress.

Lung uptake was .  Left Ventricular ejection fraction is 78%.

The rest and stress images show normal perfusion, normal contraction and thickening.



LV Perf. Quant

17 Seg. SSS0.00

17 Seg. SRS10.00

17 Seg. SDS0.00

Stress Defect Extent (% LAD)0.00Rest Defect Extent (% LAD)3.10Rev. Defect Extent (% LAD)0.00

Stress Defect Extent (% LCX) 0.00Rest Defect Extent (% LCX)18.80Rev. Defect Extent (% LCX)0.00

Stress Defect Extent (% RCA)0.00Rest Defect Extent (% RCA)68.90Rev. Defect Extent (% RCA)0.00

Stress Defect Extent (% NOÉ)0.00Rest Defect Extent (% NOÉ)25.40Rev. Defect Extent (% NOÉ)0.00



Conclusion

1. Regadenoson cardioisotope stress test did not show any evidence of ischemia or infarct.

2. Normal left ventricular systolic function with ejection fraction calculated at 78%.

3. Low risk for cardiac events.



Signed by : Laya De La Paz, 

Electronically Approved : 06/22/2020 12:26:18

## 2020-11-24 ENCOUNTER — HOSPITAL ENCOUNTER (OUTPATIENT)
Dept: HOSPITAL 61 - MAMMO | Age: 75
End: 2020-11-24
Attending: FAMILY MEDICINE
Payer: MEDICARE

## 2020-11-24 DIAGNOSIS — Z12.31: Primary | ICD-10-CM

## 2020-11-24 PROCEDURE — 77067 SCR MAMMO BI INCL CAD: CPT

## 2020-11-24 PROCEDURE — 77063 BREAST TOMOSYNTHESIS BI: CPT

## 2020-11-24 NOTE — RAD
DATE: 11/24/2020 7:45 AM



EXAM: MAMMO COOKIE SCREENING BILATERAL



HISTORY:  Screening



COMPARISON: 11/20/2019



Bilateral CC and MLO views of the breasts were performed. Bilateral breast

tomosynthesis was performed in CC and MLO projections.



This study was interpreted with the benefit of Computerized Aided Detection

(CAD).





FINDINGS:



Breast Density: FATTY  The Breast Parenchyma is primarily fatty replaced.

Breast parenchyma level density A.





No suspicious masses, microcalcifications or architectural distortion is

present to suggest malignancy in either breast.





The visualized axillae are unremarkable. 



IMPRESSION: No mammographic evidence of malignancy. 



BI-RADS CATEGORY: 1 NEGATIVE



RECOMMENDED FOLLOW-UP: 12M 12 MONTH FOLLOW-UP Annual screening mammography is

recommended, unless clinically indicated sooner based on symptoms or change in

physical exam.



PQRS compliance statement: Patient information was entered into a reminder

system with a target due date for the next mammogram.



Mammography is a sensitive method for finding small breast cancers, but it

does not detect them all and is not a substitute for careful clinical

examination.  A negative mammogram does not negate a clinically suspicious

finding and should not result in delay in biopsying a clinically suspicious

abnormality.



"Our facility is accredited by the [employer] Mammography

Program."

## 2021-03-18 ENCOUNTER — HOSPITAL ENCOUNTER (OUTPATIENT)
Dept: HOSPITAL 61 - ECHO | Age: 76
End: 2021-03-18
Attending: INTERNAL MEDICINE
Payer: MEDICARE

## 2021-03-18 DIAGNOSIS — I08.3: ICD-10-CM

## 2021-03-18 DIAGNOSIS — I20.1: Primary | ICD-10-CM

## 2021-03-18 PROCEDURE — 93306 TTE W/DOPPLER COMPLETE: CPT

## 2021-03-18 NOTE — CARD
MR#: [id number]

Account#: [id number]

Accession#: [id number]

Date of Study: 03/18/2021

Ordering Physician: LAYA DE LA PAZ, 

Referring Physician: LAYA DE LA PAZ 

Tech: Snow Blanca RDCS





--------------- APPROVED REPORT --------------





EXAM: Two-dimensional and M-mode echocardiogram with Doppler and color Doppler.



Other Information 

Quality : Fair



INDICATION

Coronary Artery Vasospasm



RISK FACTORS

Hypertension 



2D DIMENSIONS 

RVDd2.7 (2.9-3.5cm)Left Atrium(2D)2.7 (1.6-4.0cm)

IVSd1.1 (0.7-1.1cm)Aortic Root(2D)3.4 (2.0-3.7cm)

LVDd4.5 (3.9-5.9cm)LVOT Diameter2.3 (1.8-2.4cm)

PWd0.9 (0.7-1.1cm)LVDs2.3 (2.5-4.0cm)

FS (%) 30.0 %SV75.5 ml

LVEF(%)60.0 (>50%)



Aortic Valve

AoV Peak Geovany.83.3cm/sAoV VTI17.0cm

AO Peak GR.2.8mmHgLVOT Peak Geovany.72.5cm/s

AO Mean GR.2mmHgAVA (VMAX)3.74cm2

YUNI   (VTI)3.77eb3NC P 1/2 Vofe996eb



Mitral Valve

MV E Eqnyngxy56.7cm/sMV DECEL CKEL339ie

MV A Fiirrkcg10.7cm/sE/A  Ratio0.7



Tricuspid Valve

TR P. Kigzeseh042xm/sRAP MUUVJKIM6reQu

TR Peak Gr.24hkAmCSLE52fcBs



Pulmonary Vein

S1 Njlxjhof86.7cm/sD2 Hccxlufe74.5cm/s



 LEFT VENTRICLE 

The left ventricle is normal size. There is normal left ventricular wall thickness. The left ventricu
lar systolic function is normal and the ejection fraction is within normal range. EF 55% There is nor
mal LV segmental wall motion. Transmitral Doppler flow pattern is Grade I-abnormal relaxation pattern
.



 RIGHT VENTRICLE 

The right ventricle is normal size. The right ventricular systolic function is normal.



 ATRIA 

The left atrium size is normal. The right atrium size is normal. The interatrial septum is intact wit
h no evidence for an atrial septal defect or patent foramen ovale as noted on 2-D or Doppler imaging.




 AORTIC VALVE 

The aortic valve is calcified but opens well. Doppler and Color Flow revealed mild aortic regurgitati
on. There is no significant aortic valvular stenosis.



 MITRAL VALVE 

The mitral valve is calcified but opens well. There is no evidence of mitral valve prolapse. There is
 no mitral valve stenosis. Doppler and Color Flow revealed no mitral valve regurgitation noted.



 TRICUSPID VALVE 

The tricuspid valve is normal in structure and function. Doppler and Color Flow revealed trace to mil
d tricuspid regurgitation. The PA pressure was estimated at 29 mmHg. There is no tricuspid valve sten
osis.



 PULMONIC VALVE 

The pulmonic valve is not well visualized. Doppler and Color Flow revealed no pulmonic valvular regur
gitation. There is no pulmonic valvular stenosis.



 GREAT VESSELS 

The aortic root is normal in size. The ascending aorta is normal in size. The IVC is normal in size a
nd collapses >50% with inspiration.



 PERICARDIAL EFFUSION 

There is no evidence of significant pericardial effusion.



Critical Notification

Critical Value: No



<Conclusion>

The left ventricular systolic function is normal and the ejection fraction is within normal range. EF
 55%

There is normal LV segmental wall motion.

Doppler and Color Flow revealed trace to mild tricuspid regurgitation. The PA pressure was estimated 
at 29 mmHg.



Signed by : Sky Ibarra, 

Electronically Approved : 03/18/2021 13:33:14

## 2021-11-29 ENCOUNTER — HOSPITAL ENCOUNTER (OUTPATIENT)
Dept: HOSPITAL 61 - MAMMO | Age: 76
End: 2021-11-29
Attending: FAMILY MEDICINE
Payer: MEDICARE

## 2021-11-29 DIAGNOSIS — Z12.31: Primary | ICD-10-CM

## 2021-11-29 PROCEDURE — 77063 BREAST TOMOSYNTHESIS BI: CPT

## 2021-11-29 PROCEDURE — 77067 SCR MAMMO BI INCL CAD: CPT

## 2021-11-29 NOTE — RAD
BILATERAL SCREENING MAMMOGRAM



History: Routine screening.



Comparison:  Most recently on 11/24/2020. 



Technique: Routine 2D and 3D tomosynthesis digital mammogram views were obtained bilaterally. Interpr
etation was assisted with the use of computer-aided detection.



Findings: 



Breast Tissue Density A :  The breasts are almost entirely fatty.



There are no dominant masses, suspicious microcalcifications, or architectural distortion.



IMPRESSION:



No mammographic evidence of malignancy. Recommend routine screening mammography in one year.



BI-RADS category 1:  Negative.



Patient information is entered into the reminder system with a target due date for the next screening
 mammogram.



"Our facility is accredited by the [employer] Mammography Program."



Electronically signed by: LUSI VELEZ MD (11/29/2021 11:41 AM) [id number]